# Patient Record
Sex: FEMALE | Race: WHITE | Employment: UNEMPLOYED | URBAN - METROPOLITAN AREA
[De-identification: names, ages, dates, MRNs, and addresses within clinical notes are randomized per-mention and may not be internally consistent; named-entity substitution may affect disease eponyms.]

---

## 2021-01-01 ENCOUNTER — HOSPITAL ENCOUNTER (EMERGENCY)
Age: 86
Discharge: HOME | End: 2021-12-04
Attending: EMERGENCY MEDICINE
Payer: MEDICARE

## 2021-01-01 ENCOUNTER — HOSPITAL ENCOUNTER (INPATIENT)
Age: 86
LOS: 8 days | Discharge: HOSPICE | DRG: 177 | End: 2021-12-16
Attending: EMERGENCY MEDICINE | Admitting: INTERNAL MEDICINE
Payer: MEDICARE

## 2021-01-01 ENCOUNTER — APPOINTMENT (OUTPATIENT)
Dept: GENERAL RADIOLOGY | Age: 86
DRG: 177 | End: 2021-01-01
Payer: MEDICARE

## 2021-01-01 ENCOUNTER — APPOINTMENT (OUTPATIENT)
Dept: GENERAL RADIOLOGY | Age: 86
End: 2021-01-01
Payer: MEDICARE

## 2021-01-01 ENCOUNTER — APPOINTMENT (OUTPATIENT)
Dept: CT IMAGING | Age: 86
End: 2021-01-01
Payer: MEDICARE

## 2021-01-01 ENCOUNTER — CARE COORDINATION (OUTPATIENT)
Dept: CARE COORDINATION | Age: 86
End: 2021-01-01

## 2021-01-01 VITALS
OXYGEN SATURATION: 97 % | RESPIRATION RATE: 16 BRPM | HEART RATE: 80 BPM | DIASTOLIC BLOOD PRESSURE: 69 MMHG | TEMPERATURE: 98.4 F | SYSTOLIC BLOOD PRESSURE: 122 MMHG

## 2021-01-01 VITALS
WEIGHT: 95.2 LBS | DIASTOLIC BLOOD PRESSURE: 75 MMHG | HEART RATE: 89 BPM | HEIGHT: 60 IN | TEMPERATURE: 93.8 F | RESPIRATION RATE: 12 BRPM | BODY MASS INDEX: 18.69 KG/M2 | OXYGEN SATURATION: 75 % | SYSTOLIC BLOOD PRESSURE: 132 MMHG

## 2021-01-01 DIAGNOSIS — U07.1 COVID-19: Primary | ICD-10-CM

## 2021-01-01 LAB
ACANTHOCYTES: ABNORMAL
ALBUMIN SERPL-MCNC: 3 G/DL (ref 3.5–5.2)
ALBUMIN SERPL-MCNC: 3 G/DL (ref 3.5–5.2)
ALBUMIN SERPL-MCNC: 3.1 G/DL (ref 3.5–5.2)
ALBUMIN SERPL-MCNC: 3.1 G/DL (ref 3.5–5.2)
ALBUMIN SERPL-MCNC: 3.2 G/DL (ref 3.5–5.2)
ALBUMIN SERPL-MCNC: 3.3 G/DL (ref 3.5–5.2)
ALBUMIN SERPL-MCNC: 3.5 G/DL (ref 3.5–5.2)
ALBUMIN SERPL-MCNC: 3.6 G/DL (ref 3.5–5.2)
ALP BLD-CCNC: 63 U/L (ref 35–104)
ALP BLD-CCNC: 69 U/L (ref 35–104)
ALP BLD-CCNC: 72 U/L (ref 35–104)
ALP BLD-CCNC: 74 U/L (ref 35–104)
ALP BLD-CCNC: 76 U/L (ref 35–104)
ALP BLD-CCNC: 78 U/L (ref 35–104)
ALP BLD-CCNC: 78 U/L (ref 35–104)
ALP BLD-CCNC: 81 U/L (ref 35–104)
ALT SERPL-CCNC: 10 U/L (ref 0–32)
ALT SERPL-CCNC: 10 U/L (ref 0–32)
ALT SERPL-CCNC: 11 U/L (ref 0–32)
ALT SERPL-CCNC: 12 U/L (ref 0–32)
ALT SERPL-CCNC: 15 U/L (ref 0–32)
ALT SERPL-CCNC: 19 U/L (ref 0–32)
ALT SERPL-CCNC: 21 U/L (ref 0–32)
ALT SERPL-CCNC: 9 U/L (ref 0–32)
ANION GAP SERPL CALCULATED.3IONS-SCNC: 12 MMOL/L (ref 7–16)
ANION GAP SERPL CALCULATED.3IONS-SCNC: 12 MMOL/L (ref 7–16)
ANION GAP SERPL CALCULATED.3IONS-SCNC: 13 MMOL/L (ref 7–16)
ANION GAP SERPL CALCULATED.3IONS-SCNC: 14 MMOL/L (ref 7–16)
ANION GAP SERPL CALCULATED.3IONS-SCNC: 14 MMOL/L (ref 7–16)
ANION GAP SERPL CALCULATED.3IONS-SCNC: 15 MMOL/L (ref 7–16)
ANION GAP SERPL CALCULATED.3IONS-SCNC: 17 MMOL/L (ref 7–16)
ANISOCYTOSIS: ABNORMAL
APTT: 25.6 SEC (ref 24.5–35.1)
AST SERPL-CCNC: 22 U/L (ref 0–31)
AST SERPL-CCNC: 24 U/L (ref 0–31)
AST SERPL-CCNC: 25 U/L (ref 0–31)
AST SERPL-CCNC: 26 U/L (ref 0–31)
AST SERPL-CCNC: 31 U/L (ref 0–31)
AST SERPL-CCNC: 32 U/L (ref 0–31)
AST SERPL-CCNC: 32 U/L (ref 0–31)
AST SERPL-CCNC: 36 U/L (ref 0–31)
ATYPICAL LYMPHOCYTE RELATIVE PERCENT: 2.6 % (ref 0–4)
BASOPHILS ABSOLUTE: 0 E9/L (ref 0–0.2)
BASOPHILS ABSOLUTE: 0.01 E9/L (ref 0–0.2)
BASOPHILS ABSOLUTE: 0.02 E9/L (ref 0–0.2)
BASOPHILS ABSOLUTE: 0.03 E9/L (ref 0–0.2)
BASOPHILS ABSOLUTE: 0.04 E9/L (ref 0–0.2)
BASOPHILS RELATIVE PERCENT: 0 % (ref 0–2)
BASOPHILS RELATIVE PERCENT: 0.1 % (ref 0–2)
BASOPHILS RELATIVE PERCENT: 0.2 % (ref 0–2)
BASOPHILS RELATIVE PERCENT: 0.3 % (ref 0–2)
BASOPHILS RELATIVE PERCENT: 0.5 % (ref 0–2)
BILIRUB SERPL-MCNC: 0.4 MG/DL (ref 0–1.2)
BILIRUB SERPL-MCNC: 0.4 MG/DL (ref 0–1.2)
BILIRUB SERPL-MCNC: 0.5 MG/DL (ref 0–1.2)
BILIRUB SERPL-MCNC: 0.5 MG/DL (ref 0–1.2)
BILIRUB SERPL-MCNC: 0.6 MG/DL (ref 0–1.2)
BILIRUB SERPL-MCNC: 0.6 MG/DL (ref 0–1.2)
BILIRUB SERPL-MCNC: 0.8 MG/DL (ref 0–1.2)
BILIRUB SERPL-MCNC: 0.8 MG/DL (ref 0–1.2)
BUN BLDV-MCNC: 25 MG/DL (ref 6–23)
BUN BLDV-MCNC: 29 MG/DL (ref 6–23)
BUN BLDV-MCNC: 31 MG/DL (ref 6–23)
BUN BLDV-MCNC: 43 MG/DL (ref 6–23)
BUN BLDV-MCNC: 45 MG/DL (ref 6–23)
BUN BLDV-MCNC: 56 MG/DL (ref 6–23)
BUN BLDV-MCNC: 84 MG/DL (ref 6–23)
BUN BLDV-MCNC: 92 MG/DL (ref 6–23)
BUN BLDV-MCNC: 94 MG/DL (ref 6–23)
BURR CELLS: ABNORMAL
BURR CELLS: ABNORMAL
C-REACTIVE PROTEIN: 0.9 MG/DL (ref 0–0.4)
C-REACTIVE PROTEIN: 1.6 MG/DL (ref 0–0.4)
C-REACTIVE PROTEIN: 11.7 MG/DL (ref 0–0.4)
C-REACTIVE PROTEIN: 18.5 MG/DL (ref 0–0.4)
C-REACTIVE PROTEIN: 6.6 MG/DL (ref 0–0.4)
C-REACTIVE PROTEIN: 7.7 MG/DL (ref 0–0.4)
C-REACTIVE PROTEIN: 7.8 MG/DL (ref 0–0.4)
CALCIUM SERPL-MCNC: 10.1 MG/DL (ref 8.6–10.2)
CALCIUM SERPL-MCNC: 10.1 MG/DL (ref 8.6–10.2)
CALCIUM SERPL-MCNC: 9.7 MG/DL (ref 8.6–10.2)
CALCIUM SERPL-MCNC: 9.8 MG/DL (ref 8.6–10.2)
CHLORIDE BLD-SCNC: 94 MMOL/L (ref 98–107)
CHLORIDE BLD-SCNC: 94 MMOL/L (ref 98–107)
CHLORIDE BLD-SCNC: 95 MMOL/L (ref 98–107)
CHLORIDE BLD-SCNC: 96 MMOL/L (ref 98–107)
CHLORIDE BLD-SCNC: 96 MMOL/L (ref 98–107)
CHLORIDE BLD-SCNC: 97 MMOL/L (ref 98–107)
CHLORIDE BLD-SCNC: 98 MMOL/L (ref 98–107)
CHLORIDE BLD-SCNC: 99 MMOL/L (ref 98–107)
CHLORIDE BLD-SCNC: 99 MMOL/L (ref 98–107)
CO2: 25 MMOL/L (ref 22–29)
CO2: 25 MMOL/L (ref 22–29)
CO2: 28 MMOL/L (ref 22–29)
CO2: 29 MMOL/L (ref 22–29)
CO2: 29 MMOL/L (ref 22–29)
CO2: 30 MMOL/L (ref 22–29)
CREAT SERPL-MCNC: 0.9 MG/DL (ref 0.5–1)
CREAT SERPL-MCNC: 1 MG/DL (ref 0.5–1)
CREAT SERPL-MCNC: 1.1 MG/DL (ref 0.5–1)
CREAT SERPL-MCNC: 1.1 MG/DL (ref 0.5–1)
CREAT SERPL-MCNC: 1.2 MG/DL (ref 0.5–1)
CREAT SERPL-MCNC: 1.3 MG/DL (ref 0.5–1)
D DIMER: 3206 NG/ML DDU
D DIMER: 4534 NG/ML DDU
D DIMER: 733 NG/ML DDU
D DIMER: 871 NG/ML DDU
D DIMER: 947 NG/ML DDU
D DIMER: 954 NG/ML DDU
D DIMER: 971 NG/ML DDU
EKG ATRIAL RATE: 125 BPM
EKG ATRIAL RATE: 77 BPM
EKG P AXIS: 61 DEGREES
EKG P AXIS: 72 DEGREES
EKG P-R INTERVAL: 120 MS
EKG P-R INTERVAL: 160 MS
EKG Q-T INTERVAL: 312 MS
EKG Q-T INTERVAL: 390 MS
EKG QRS DURATION: 60 MS
EKG QRS DURATION: 66 MS
EKG QTC CALCULATION (BAZETT): 441 MS
EKG QTC CALCULATION (BAZETT): 450 MS
EKG R AXIS: 70 DEGREES
EKG R AXIS: 76 DEGREES
EKG T AXIS: 69 DEGREES
EKG T AXIS: 72 DEGREES
EKG VENTRICULAR RATE: 125 BPM
EKG VENTRICULAR RATE: 77 BPM
EOSINOPHILS ABSOLUTE: 0 E9/L (ref 0.05–0.5)
EOSINOPHILS ABSOLUTE: 0.01 E9/L (ref 0.05–0.5)
EOSINOPHILS ABSOLUTE: 0.01 E9/L (ref 0.05–0.5)
EOSINOPHILS ABSOLUTE: 0.02 E9/L (ref 0.05–0.5)
EOSINOPHILS ABSOLUTE: 0.03 E9/L (ref 0.05–0.5)
EOSINOPHILS RELATIVE PERCENT: 0 % (ref 0–6)
EOSINOPHILS RELATIVE PERCENT: 0.1 % (ref 0–6)
EOSINOPHILS RELATIVE PERCENT: 0.1 % (ref 0–6)
EOSINOPHILS RELATIVE PERCENT: 0.3 % (ref 0–6)
EOSINOPHILS RELATIVE PERCENT: 0.3 % (ref 0–6)
FERRITIN: 1100 NG/ML
FIBRINOGEN: >700 MG/DL (ref 225–540)
GFR AFRICAN AMERICAN: 46
GFR AFRICAN AMERICAN: 51
GFR AFRICAN AMERICAN: 56
GFR AFRICAN AMERICAN: 56
GFR AFRICAN AMERICAN: >60
GFR NON-AFRICAN AMERICAN: 38 ML/MIN/1.73
GFR NON-AFRICAN AMERICAN: 42 ML/MIN/1.73
GFR NON-AFRICAN AMERICAN: 46 ML/MIN/1.73
GFR NON-AFRICAN AMERICAN: 46 ML/MIN/1.73
GFR NON-AFRICAN AMERICAN: 52 ML/MIN/1.73
GFR NON-AFRICAN AMERICAN: 58 ML/MIN/1.73
GLUCOSE BLD-MCNC: 102 MG/DL (ref 74–99)
GLUCOSE BLD-MCNC: 109 MG/DL (ref 74–99)
GLUCOSE BLD-MCNC: 119 MG/DL (ref 74–99)
GLUCOSE BLD-MCNC: 124 MG/DL (ref 74–99)
GLUCOSE BLD-MCNC: 141 MG/DL (ref 74–99)
GLUCOSE BLD-MCNC: 141 MG/DL (ref 74–99)
GLUCOSE BLD-MCNC: 174 MG/DL (ref 74–99)
GLUCOSE BLD-MCNC: 179 MG/DL (ref 74–99)
GLUCOSE BLD-MCNC: 188 MG/DL (ref 74–99)
HCT VFR BLD CALC: 40.4 % (ref 34–48)
HCT VFR BLD CALC: 41.2 % (ref 34–48)
HCT VFR BLD CALC: 42.5 % (ref 34–48)
HCT VFR BLD CALC: 42.9 % (ref 34–48)
HCT VFR BLD CALC: 43.3 % (ref 34–48)
HEMOGLOBIN: 13 G/DL (ref 11.5–15.5)
HEMOGLOBIN: 13.2 G/DL (ref 11.5–15.5)
HEMOGLOBIN: 13.3 G/DL (ref 11.5–15.5)
HEMOGLOBIN: 13.4 G/DL (ref 11.5–15.5)
HEMOGLOBIN: 13.8 G/DL (ref 11.5–15.5)
IMMATURE GRANULOCYTES #: 0.04 E9/L
IMMATURE GRANULOCYTES #: 0.05 E9/L
IMMATURE GRANULOCYTES #: 0.07 E9/L
IMMATURE GRANULOCYTES #: 0.12 E9/L
IMMATURE GRANULOCYTES %: 0.4 % (ref 0–5)
IMMATURE GRANULOCYTES %: 0.5 % (ref 0–5)
IMMATURE GRANULOCYTES %: 0.6 % (ref 0–5)
IMMATURE GRANULOCYTES %: 1 % (ref 0–5)
INR BLD: 1.1
LACTATE DEHYDROGENASE: 352 U/L (ref 135–214)
LACTIC ACID: 1.7 MMOL/L (ref 0.5–2.2)
LYMPHOCYTES ABSOLUTE: 0.28 E9/L (ref 1.5–4)
LYMPHOCYTES ABSOLUTE: 0.45 E9/L (ref 1.5–4)
LYMPHOCYTES ABSOLUTE: 0.66 E9/L (ref 1.5–4)
LYMPHOCYTES ABSOLUTE: 0.97 E9/L (ref 1.5–4)
LYMPHOCYTES ABSOLUTE: 1.43 E9/L (ref 1.5–4)
LYMPHOCYTES RELATIVE PERCENT: 12.1 % (ref 20–42)
LYMPHOCYTES RELATIVE PERCENT: 2.6 % (ref 20–42)
LYMPHOCYTES RELATIVE PERCENT: 5.6 % (ref 20–42)
LYMPHOCYTES RELATIVE PERCENT: 5.6 % (ref 20–42)
LYMPHOCYTES RELATIVE PERCENT: 8.2 % (ref 20–42)
MAGNESIUM: 1.8 MG/DL (ref 1.6–2.6)
MAGNESIUM: 2.1 MG/DL (ref 1.6–2.6)
MCH RBC QN AUTO: 27 PG (ref 26–35)
MCH RBC QN AUTO: 27.4 PG (ref 26–35)
MCH RBC QN AUTO: 27.5 PG (ref 26–35)
MCHC RBC AUTO-ENTMCNC: 31.1 % (ref 32–34.5)
MCHC RBC AUTO-ENTMCNC: 31.2 % (ref 32–34.5)
MCHC RBC AUTO-ENTMCNC: 31.9 % (ref 32–34.5)
MCHC RBC AUTO-ENTMCNC: 32.2 % (ref 32–34.5)
MCHC RBC AUTO-ENTMCNC: 32.3 % (ref 32–34.5)
MCV RBC AUTO: 85.1 FL (ref 80–99.9)
MCV RBC AUTO: 85.2 FL (ref 80–99.9)
MCV RBC AUTO: 85.9 FL (ref 80–99.9)
MCV RBC AUTO: 87.1 FL (ref 80–99.9)
MCV RBC AUTO: 87.7 FL (ref 80–99.9)
METAMYELOCYTES RELATIVE PERCENT: 0.9 % (ref 0–1)
MONOCYTES ABSOLUTE: 0.17 E9/L (ref 0.1–0.95)
MONOCYTES ABSOLUTE: 0.32 E9/L (ref 0.1–0.95)
MONOCYTES ABSOLUTE: 0.54 E9/L (ref 0.1–0.95)
MONOCYTES ABSOLUTE: 0.95 E9/L (ref 0.1–0.95)
MONOCYTES ABSOLUTE: 1.14 E9/L (ref 0.1–0.95)
MONOCYTES RELATIVE PERCENT: 2.6 % (ref 2–12)
MONOCYTES RELATIVE PERCENT: 2.7 % (ref 2–12)
MONOCYTES RELATIVE PERCENT: 6.8 % (ref 2–12)
MONOCYTES RELATIVE PERCENT: 8.1 % (ref 2–12)
MONOCYTES RELATIVE PERCENT: 9.7 % (ref 2–12)
MYELOCYTE PERCENT: 0.9 % (ref 0–0)
NEUTROPHILS ABSOLUTE: 10.8 E9/L (ref 1.8–7.3)
NEUTROPHILS ABSOLUTE: 5.15 E9/L (ref 1.8–7.3)
NEUTROPHILS ABSOLUTE: 6.88 E9/L (ref 1.8–7.3)
NEUTROPHILS ABSOLUTE: 9.12 E9/L (ref 1.8–7.3)
NEUTROPHILS ABSOLUTE: 9.68 E9/L (ref 1.8–7.3)
NEUTROPHILS RELATIVE PERCENT: 77.4 % (ref 43–80)
NEUTROPHILS RELATIVE PERCENT: 82.2 % (ref 43–80)
NEUTROPHILS RELATIVE PERCENT: 86.3 % (ref 43–80)
NEUTROPHILS RELATIVE PERCENT: 90.4 % (ref 43–80)
NEUTROPHILS RELATIVE PERCENT: 90.9 % (ref 43–80)
NUCLEATED RED BLOOD CELLS: 0 /100 WBC
OVALOCYTES: ABNORMAL
PDW BLD-RTO: 13.2 FL (ref 11.5–15)
PDW BLD-RTO: 13.3 FL (ref 11.5–15)
PDW BLD-RTO: 13.4 FL (ref 11.5–15)
PHOSPHORUS: 3.3 MG/DL (ref 2.5–4.5)
PLATELET # BLD: 228 E9/L (ref 130–450)
PLATELET # BLD: 234 E9/L (ref 130–450)
PLATELET # BLD: 236 E9/L (ref 130–450)
PLATELET # BLD: 302 E9/L (ref 130–450)
PLATELET # BLD: 303 E9/L (ref 130–450)
PMV BLD AUTO: 10.1 FL (ref 7–12)
PMV BLD AUTO: 10.6 FL (ref 7–12)
PMV BLD AUTO: 10.7 FL (ref 7–12)
PMV BLD AUTO: 11 FL (ref 7–12)
PMV BLD AUTO: 9.7 FL (ref 7–12)
POIKILOCYTES: ABNORMAL
POIKILOCYTES: ABNORMAL
POLYCHROMASIA: ABNORMAL
POTASSIUM REFLEX MAGNESIUM: 4 MMOL/L (ref 3.5–5)
POTASSIUM SERPL-SCNC: 3.4 MMOL/L (ref 3.5–5)
POTASSIUM SERPL-SCNC: 3.6 MMOL/L (ref 3.5–5)
POTASSIUM SERPL-SCNC: 3.8 MMOL/L (ref 3.5–5)
POTASSIUM SERPL-SCNC: 4.4 MMOL/L (ref 3.5–5)
POTASSIUM SERPL-SCNC: 4.5 MMOL/L (ref 3.5–5)
POTASSIUM SERPL-SCNC: 4.7 MMOL/L (ref 3.5–5)
PRO-BNP: 9137 PG/ML (ref 0–450)
PROCALCITONIN: 0.07 NG/ML (ref 0–0.08)
PROCALCITONIN: 0.14 NG/ML (ref 0–0.08)
PROTHROMBIN TIME: 12 SEC (ref 9.3–12.4)
RBC # BLD: 4.74 E12/L (ref 3.5–5.5)
RBC # BLD: 4.84 E12/L (ref 3.5–5.5)
RBC # BLD: 4.88 E12/L (ref 3.5–5.5)
RBC # BLD: 4.89 E12/L (ref 3.5–5.5)
RBC # BLD: 5.04 E12/L (ref 3.5–5.5)
SARS-COV-2, NAAT: DETECTED
SODIUM BLD-SCNC: 136 MMOL/L (ref 132–146)
SODIUM BLD-SCNC: 137 MMOL/L (ref 132–146)
SODIUM BLD-SCNC: 137 MMOL/L (ref 132–146)
SODIUM BLD-SCNC: 139 MMOL/L (ref 132–146)
SODIUM BLD-SCNC: 141 MMOL/L (ref 132–146)
SODIUM BLD-SCNC: 143 MMOL/L (ref 132–146)
TOTAL PROTEIN: 6.1 G/DL (ref 6.4–8.3)
TOTAL PROTEIN: 6.4 G/DL (ref 6.4–8.3)
TOTAL PROTEIN: 6.4 G/DL (ref 6.4–8.3)
TOTAL PROTEIN: 6.5 G/DL (ref 6.4–8.3)
TOTAL PROTEIN: 6.6 G/DL (ref 6.4–8.3)
TOTAL PROTEIN: 6.7 G/DL (ref 6.4–8.3)
TOTAL PROTEIN: 6.8 G/DL (ref 6.4–8.3)
TOTAL PROTEIN: 7 G/DL (ref 6.4–8.3)
TROPONIN, HIGH SENSITIVITY: 24 NG/L (ref 0–9)
TROPONIN, HIGH SENSITIVITY: 35 NG/L (ref 0–9)
TROPONIN, HIGH SENSITIVITY: 39 NG/L (ref 0–9)
TROPONIN, HIGH SENSITIVITY: 49 NG/L (ref 0–9)
TROPONIN, HIGH SENSITIVITY: 53 NG/L (ref 0–9)
TROPONIN, HIGH SENSITIVITY: 54 NG/L (ref 0–9)
WBC # BLD: 11.8 E9/L (ref 4.5–11.5)
WBC # BLD: 11.8 E9/L (ref 4.5–11.5)
WBC # BLD: 11.9 E9/L (ref 4.5–11.5)
WBC # BLD: 5.6 E9/L (ref 4.5–11.5)
WBC # BLD: 8 E9/L (ref 4.5–11.5)

## 2021-01-01 PROCEDURE — 99233 SBSQ HOSP IP/OBS HIGH 50: CPT | Performed by: INTERNAL MEDICINE

## 2021-01-01 PROCEDURE — 84100 ASSAY OF PHOSPHORUS: CPT

## 2021-01-01 PROCEDURE — 36415 COLL VENOUS BLD VENIPUNCTURE: CPT

## 2021-01-01 PROCEDURE — 6370000000 HC RX 637 (ALT 250 FOR IP): Performed by: PHYSICIAN ASSISTANT

## 2021-01-01 PROCEDURE — 6360000002 HC RX W HCPCS: Performed by: STUDENT IN AN ORGANIZED HEALTH CARE EDUCATION/TRAINING PROGRAM

## 2021-01-01 PROCEDURE — 6360000002 HC RX W HCPCS: Performed by: INTERNAL MEDICINE

## 2021-01-01 PROCEDURE — 6370000000 HC RX 637 (ALT 250 FOR IP): Performed by: EMERGENCY MEDICINE

## 2021-01-01 PROCEDURE — 2580000003 HC RX 258: Performed by: NURSE PRACTITIONER

## 2021-01-01 PROCEDURE — 99232 SBSQ HOSP IP/OBS MODERATE 35: CPT | Performed by: INTERNAL MEDICINE

## 2021-01-01 PROCEDURE — 85378 FIBRIN DEGRADE SEMIQUANT: CPT

## 2021-01-01 PROCEDURE — 83735 ASSAY OF MAGNESIUM: CPT

## 2021-01-01 PROCEDURE — 86140 C-REACTIVE PROTEIN: CPT

## 2021-01-01 PROCEDURE — 6370000000 HC RX 637 (ALT 250 FOR IP): Performed by: NURSE PRACTITIONER

## 2021-01-01 PROCEDURE — 82728 ASSAY OF FERRITIN: CPT

## 2021-01-01 PROCEDURE — 93010 ELECTROCARDIOGRAM REPORT: CPT | Performed by: INTERNAL MEDICINE

## 2021-01-01 PROCEDURE — 6360000004 HC RX CONTRAST MEDICATION: Performed by: RADIOLOGY

## 2021-01-01 PROCEDURE — 73030 X-RAY EXAM OF SHOULDER: CPT

## 2021-01-01 PROCEDURE — 97535 SELF CARE MNGMENT TRAINING: CPT

## 2021-01-01 PROCEDURE — 80053 COMPREHEN METABOLIC PANEL: CPT

## 2021-01-01 PROCEDURE — 99282 EMERGENCY DEPT VISIT SF MDM: CPT

## 2021-01-01 PROCEDURE — 2700000000 HC OXYGEN THERAPY PER DAY

## 2021-01-01 PROCEDURE — 83615 LACTATE (LD) (LDH) ENZYME: CPT

## 2021-01-01 PROCEDURE — 2060000000 HC ICU INTERMEDIATE R&B

## 2021-01-01 PROCEDURE — 85610 PROTHROMBIN TIME: CPT

## 2021-01-01 PROCEDURE — 85025 COMPLETE CBC W/AUTO DIFF WBC: CPT

## 2021-01-01 PROCEDURE — 84484 ASSAY OF TROPONIN QUANT: CPT

## 2021-01-01 PROCEDURE — 80048 BASIC METABOLIC PNL TOTAL CA: CPT

## 2021-01-01 PROCEDURE — 71275 CT ANGIOGRAPHY CHEST: CPT

## 2021-01-01 PROCEDURE — 93005 ELECTROCARDIOGRAM TRACING: CPT | Performed by: NURSE PRACTITIONER

## 2021-01-01 PROCEDURE — 6360000002 HC RX W HCPCS: Performed by: EMERGENCY MEDICINE

## 2021-01-01 PROCEDURE — 73060 X-RAY EXAM OF HUMERUS: CPT

## 2021-01-01 PROCEDURE — 85730 THROMBOPLASTIN TIME PARTIAL: CPT

## 2021-01-01 PROCEDURE — 99232 SBSQ HOSP IP/OBS MODERATE 35: CPT | Performed by: STUDENT IN AN ORGANIZED HEALTH CARE EDUCATION/TRAINING PROGRAM

## 2021-01-01 PROCEDURE — XW0DXM6 INTRODUCTION OF BARICITINIB INTO MOUTH AND PHARYNX, EXTERNAL APPROACH, NEW TECHNOLOGY GROUP 6: ICD-10-PCS | Performed by: FAMILY MEDICINE

## 2021-01-01 PROCEDURE — APPSS45 APP SPLIT SHARED TIME 31-45 MINUTES: Performed by: NURSE PRACTITIONER

## 2021-01-01 PROCEDURE — 83880 ASSAY OF NATRIURETIC PEPTIDE: CPT

## 2021-01-01 PROCEDURE — 83605 ASSAY OF LACTIC ACID: CPT

## 2021-01-01 PROCEDURE — 93005 ELECTROCARDIOGRAM TRACING: CPT | Performed by: PHYSICIAN ASSISTANT

## 2021-01-01 PROCEDURE — 99222 1ST HOSP IP/OBS MODERATE 55: CPT | Performed by: STUDENT IN AN ORGANIZED HEALTH CARE EDUCATION/TRAINING PROGRAM

## 2021-01-01 PROCEDURE — 97530 THERAPEUTIC ACTIVITIES: CPT

## 2021-01-01 PROCEDURE — 99284 EMERGENCY DEPT VISIT MOD MDM: CPT

## 2021-01-01 PROCEDURE — 71046 X-RAY EXAM CHEST 2 VIEWS: CPT

## 2021-01-01 PROCEDURE — 97161 PT EVAL LOW COMPLEX 20 MIN: CPT

## 2021-01-01 PROCEDURE — 71045 X-RAY EXAM CHEST 1 VIEW: CPT

## 2021-01-01 PROCEDURE — 84145 PROCALCITONIN (PCT): CPT

## 2021-01-01 PROCEDURE — 72170 X-RAY EXAM OF PELVIS: CPT

## 2021-01-01 PROCEDURE — 73600 X-RAY EXAM OF ANKLE: CPT

## 2021-01-01 PROCEDURE — 85384 FIBRINOGEN ACTIVITY: CPT

## 2021-01-01 PROCEDURE — 87635 SARS-COV-2 COVID-19 AMP PRB: CPT

## 2021-01-01 PROCEDURE — 6360000002 HC RX W HCPCS: Performed by: NURSE PRACTITIONER

## 2021-01-01 PROCEDURE — 97165 OT EVAL LOW COMPLEX 30 MIN: CPT

## 2021-01-01 RX ORDER — DEXAMETHASONE 4 MG/1
6 TABLET ORAL DAILY
Status: DISCONTINUED | OUTPATIENT
Start: 2021-01-01 | End: 2021-01-01

## 2021-01-01 RX ORDER — GLYCOPYRROLATE 0.2 MG/ML
0.4 INJECTION INTRAMUSCULAR; INTRAVENOUS EVERY 6 HOURS PRN
Status: DISCONTINUED | OUTPATIENT
Start: 2021-01-01 | End: 2021-01-01 | Stop reason: HOSPADM

## 2021-01-01 RX ORDER — DEXAMETHASONE SODIUM PHOSPHATE 10 MG/ML
10 INJECTION, SOLUTION INTRAMUSCULAR; INTRAVENOUS EVERY 12 HOURS
Status: DISCONTINUED | OUTPATIENT
Start: 2021-01-01 | End: 2021-01-01

## 2021-01-01 RX ORDER — ATORVASTATIN CALCIUM 40 MG/1
20 TABLET, FILM COATED ORAL DAILY
Status: DISCONTINUED | OUTPATIENT
Start: 2021-01-01 | End: 2021-01-01

## 2021-01-01 RX ORDER — ONDANSETRON 2 MG/ML
4 INJECTION INTRAMUSCULAR; INTRAVENOUS EVERY 6 HOURS PRN
Status: DISCONTINUED | OUTPATIENT
Start: 2021-01-01 | End: 2021-01-01 | Stop reason: HOSPADM

## 2021-01-01 RX ORDER — METOPROLOL SUCCINATE 25 MG/1
50 TABLET, EXTENDED RELEASE ORAL DAILY
Status: DISCONTINUED | OUTPATIENT
Start: 2021-01-01 | End: 2021-01-01 | Stop reason: HOSPADM

## 2021-01-01 RX ORDER — ONDANSETRON 4 MG/1
4 TABLET, ORALLY DISINTEGRATING ORAL 3 TIMES DAILY PRN
Qty: 21 TABLET | Refills: 0 | Status: SHIPPED | OUTPATIENT
Start: 2021-01-01

## 2021-01-01 RX ORDER — ALBUTEROL SULFATE 90 UG/1
2 AEROSOL, METERED RESPIRATORY (INHALATION) 4 TIMES DAILY
Status: DISCONTINUED | OUTPATIENT
Start: 2021-01-01 | End: 2021-01-01

## 2021-01-01 RX ORDER — BROMPHENIRAMINE MALEATE, PSEUDOEPHEDRINE HYDROCHLORIDE, AND DEXTROMETHORPHAN HYDROBROMIDE 2; 30; 10 MG/5ML; MG/5ML; MG/5ML
5 SYRUP ORAL 4 TIMES DAILY PRN
Qty: 240 ML | Refills: 0 | Status: SHIPPED | OUTPATIENT
Start: 2021-01-01 | End: 2022-01-03

## 2021-01-01 RX ORDER — BROMPHENIRAMINE MALEATE, PSEUDOEPHEDRINE HYDROCHLORIDE, AND DEXTROMETHORPHAN HYDROBROMIDE 2; 30; 10 MG/5ML; MG/5ML; MG/5ML
5 SYRUP ORAL ONCE
Status: COMPLETED | OUTPATIENT
Start: 2021-01-01 | End: 2021-01-01

## 2021-01-01 RX ORDER — VITAMIN B COMPLEX
2000 TABLET ORAL DAILY
Status: DISCONTINUED | OUTPATIENT
Start: 2021-01-01 | End: 2021-01-01

## 2021-01-01 RX ORDER — DEXAMETHASONE SODIUM PHOSPHATE 10 MG/ML
10 INJECTION INTRAMUSCULAR; INTRAVENOUS ONCE
Status: COMPLETED | OUTPATIENT
Start: 2021-01-01 | End: 2021-01-01

## 2021-01-01 RX ORDER — SODIUM CHLORIDE 9 MG/ML
25 INJECTION, SOLUTION INTRAVENOUS PRN
Status: DISCONTINUED | OUTPATIENT
Start: 2021-01-01 | End: 2021-01-01 | Stop reason: HOSPADM

## 2021-01-01 RX ORDER — GUAIFENESIN/DEXTROMETHORPHAN 100-10MG/5
5 SYRUP ORAL EVERY 4 HOURS PRN
Status: DISCONTINUED | OUTPATIENT
Start: 2021-01-01 | End: 2021-01-01 | Stop reason: HOSPADM

## 2021-01-01 RX ORDER — ONDANSETRON 4 MG/1
4 TABLET, ORALLY DISINTEGRATING ORAL EVERY 8 HOURS PRN
Status: DISCONTINUED | OUTPATIENT
Start: 2021-01-01 | End: 2021-01-01 | Stop reason: HOSPADM

## 2021-01-01 RX ORDER — ASCORBIC ACID 500 MG
500 TABLET ORAL 3 TIMES DAILY
Status: DISCONTINUED | OUTPATIENT
Start: 2021-01-01 | End: 2021-01-01

## 2021-01-01 RX ORDER — SIMVASTATIN 40 MG
40 TABLET ORAL DAILY
COMMUNITY

## 2021-01-01 RX ORDER — ZINC SULFATE 50(220)MG
50 CAPSULE ORAL DAILY
Status: DISCONTINUED | OUTPATIENT
Start: 2021-01-01 | End: 2021-01-01

## 2021-01-01 RX ORDER — ACETAMINOPHEN 650 MG/1
650 SUPPOSITORY RECTAL EVERY 6 HOURS PRN
Status: DISCONTINUED | OUTPATIENT
Start: 2021-01-01 | End: 2021-01-01 | Stop reason: HOSPADM

## 2021-01-01 RX ORDER — MORPHINE SULFATE 2 MG/ML
2 INJECTION, SOLUTION INTRAMUSCULAR; INTRAVENOUS
Status: DISCONTINUED | OUTPATIENT
Start: 2021-01-01 | End: 2021-01-01 | Stop reason: HOSPADM

## 2021-01-01 RX ORDER — LORAZEPAM 2 MG/ML
0.5 INJECTION INTRAMUSCULAR
Status: DISCONTINUED | OUTPATIENT
Start: 2021-01-01 | End: 2021-01-01 | Stop reason: HOSPADM

## 2021-01-01 RX ORDER — SODIUM CHLORIDE 0.9 % (FLUSH) 0.9 %
5-40 SYRINGE (ML) INJECTION EVERY 12 HOURS SCHEDULED
Status: DISCONTINUED | OUTPATIENT
Start: 2021-01-01 | End: 2021-01-01

## 2021-01-01 RX ORDER — SODIUM CHLORIDE 0.9 % (FLUSH) 0.9 %
5-40 SYRINGE (ML) INJECTION PRN
Status: DISCONTINUED | OUTPATIENT
Start: 2021-01-01 | End: 2021-01-01 | Stop reason: HOSPADM

## 2021-01-01 RX ORDER — ACETAMINOPHEN 325 MG/1
650 TABLET ORAL EVERY 6 HOURS PRN
Status: DISCONTINUED | OUTPATIENT
Start: 2021-01-01 | End: 2021-01-01 | Stop reason: HOSPADM

## 2021-01-01 RX ORDER — POLYETHYLENE GLYCOL 3350 17 G/17G
17 POWDER, FOR SOLUTION ORAL DAILY PRN
Status: DISCONTINUED | OUTPATIENT
Start: 2021-01-01 | End: 2021-01-01 | Stop reason: HOSPADM

## 2021-01-01 RX ORDER — METOPROLOL SUCCINATE 50 MG/1
50 TABLET, EXTENDED RELEASE ORAL DAILY
COMMUNITY

## 2021-01-01 RX ADMIN — Medication 10 ML: at 10:15

## 2021-01-01 RX ADMIN — ALBUTEROL SULFATE 2 PUFF: 90 AEROSOL, METERED RESPIRATORY (INHALATION) at 20:53

## 2021-01-01 RX ADMIN — OXYCODONE HYDROCHLORIDE AND ACETAMINOPHEN 500 MG: 500 TABLET ORAL at 18:44

## 2021-01-01 RX ADMIN — METOPROLOL SUCCINATE 50 MG: 50 TABLET, EXTENDED RELEASE ORAL at 08:24

## 2021-01-01 RX ADMIN — DEXAMETHASONE SODIUM PHOSPHATE 10 MG: 10 INJECTION INTRAMUSCULAR; INTRAVENOUS at 20:18

## 2021-01-01 RX ADMIN — BARICITINIB 2 MG: 2 TABLET, FILM COATED ORAL at 10:04

## 2021-01-01 RX ADMIN — DEXAMETHASONE SODIUM PHOSPHATE 10 MG: 10 INJECTION INTRAMUSCULAR; INTRAVENOUS at 10:17

## 2021-01-01 RX ADMIN — ALBUTEROL SULFATE 2 PUFF: 90 AEROSOL, METERED RESPIRATORY (INHALATION) at 10:44

## 2021-01-01 RX ADMIN — ALBUTEROL SULFATE 2 PUFF: 90 AEROSOL, METERED RESPIRATORY (INHALATION) at 20:18

## 2021-01-01 RX ADMIN — METOPROLOL SUCCINATE 50 MG: 50 TABLET, EXTENDED RELEASE ORAL at 17:29

## 2021-01-01 RX ADMIN — LORAZEPAM 0.5 MG: 2 INJECTION INTRAMUSCULAR; INTRAVENOUS at 15:53

## 2021-01-01 RX ADMIN — ALBUTEROL SULFATE 2 PUFF: 90 AEROSOL, METERED RESPIRATORY (INHALATION) at 20:03

## 2021-01-01 RX ADMIN — ENOXAPARIN SODIUM 30 MG: 100 INJECTION SUBCUTANEOUS at 10:53

## 2021-01-01 RX ADMIN — Medication 2000 UNITS: at 09:42

## 2021-01-01 RX ADMIN — DEXAMETHASONE SODIUM PHOSPHATE 10 MG: 10 INJECTION INTRAMUSCULAR; INTRAVENOUS at 21:01

## 2021-01-01 RX ADMIN — OXYCODONE HYDROCHLORIDE AND ACETAMINOPHEN 500 MG: 500 TABLET ORAL at 10:41

## 2021-01-01 RX ADMIN — ENOXAPARIN SODIUM 30 MG: 100 INJECTION SUBCUTANEOUS at 14:33

## 2021-01-01 RX ADMIN — BROMPHENIRAMINE MALEATE, PSEUDOEPHEDRINE HYDROCHLORIDE AND DEXTROMETHORPHAN HYDROBROMIDE 5 ML: 2; 30; 10 SYRUP ORAL at 23:48

## 2021-01-01 RX ADMIN — ENOXAPARIN SODIUM 30 MG: 100 INJECTION SUBCUTANEOUS at 09:42

## 2021-01-01 RX ADMIN — DEXAMETHASONE SODIUM PHOSPHATE 10 MG: 10 INJECTION INTRAMUSCULAR; INTRAVENOUS at 01:38

## 2021-01-01 RX ADMIN — OXYCODONE HYDROCHLORIDE AND ACETAMINOPHEN 500 MG: 500 TABLET ORAL at 10:16

## 2021-01-01 RX ADMIN — MORPHINE SULFATE 2 MG: 2 INJECTION, SOLUTION INTRAMUSCULAR; INTRAVENOUS at 17:43

## 2021-01-01 RX ADMIN — ENOXAPARIN SODIUM 30 MG: 100 INJECTION SUBCUTANEOUS at 10:41

## 2021-01-01 RX ADMIN — METOPROLOL SUCCINATE 50 MG: 50 TABLET, EXTENDED RELEASE ORAL at 10:41

## 2021-01-01 RX ADMIN — Medication 10 ML: at 20:03

## 2021-01-01 RX ADMIN — BARICITINIB 2 MG: 2 TABLET, FILM COATED ORAL at 17:32

## 2021-01-01 RX ADMIN — ALBUTEROL SULFATE 2 PUFF: 90 AEROSOL, METERED RESPIRATORY (INHALATION) at 20:11

## 2021-01-01 RX ADMIN — DESMOPRESSIN ACETATE 20 MG: 0.2 TABLET ORAL at 10:47

## 2021-01-01 RX ADMIN — OXYCODONE HYDROCHLORIDE AND ACETAMINOPHEN 500 MG: 500 TABLET ORAL at 20:50

## 2021-01-01 RX ADMIN — BARICITINIB 2 MG: 2 TABLET, FILM COATED ORAL at 08:23

## 2021-01-01 RX ADMIN — OXYCODONE HYDROCHLORIDE AND ACETAMINOPHEN 500 MG: 500 TABLET ORAL at 20:03

## 2021-01-01 RX ADMIN — ALBUTEROL SULFATE 2 PUFF: 90 AEROSOL, METERED RESPIRATORY (INHALATION) at 12:30

## 2021-01-01 RX ADMIN — ALBUTEROL SULFATE 2 PUFF: 90 AEROSOL, METERED RESPIRATORY (INHALATION) at 10:47

## 2021-01-01 RX ADMIN — Medication 10 ML: at 09:43

## 2021-01-01 RX ADMIN — ALBUTEROL SULFATE 2 PUFF: 90 AEROSOL, METERED RESPIRATORY (INHALATION) at 16:45

## 2021-01-01 RX ADMIN — Medication 10 ML: at 22:36

## 2021-01-01 RX ADMIN — DEXAMETHASONE SODIUM PHOSPHATE 10 MG: 10 INJECTION INTRAMUSCULAR; INTRAVENOUS at 19:48

## 2021-01-01 RX ADMIN — OXYCODONE HYDROCHLORIDE AND ACETAMINOPHEN 500 MG: 500 TABLET ORAL at 14:29

## 2021-01-01 RX ADMIN — GUAIFENESIN SYRUP AND DEXTROMETHORPHAN 5 ML: 100; 10 SYRUP ORAL at 14:28

## 2021-01-01 RX ADMIN — Medication 2000 UNITS: at 08:23

## 2021-01-01 RX ADMIN — Medication 10 ML: at 20:50

## 2021-01-01 RX ADMIN — DEXAMETHASONE SODIUM PHOSPHATE 10 MG: 10 INJECTION INTRAMUSCULAR; INTRAVENOUS at 20:50

## 2021-01-01 RX ADMIN — ALBUTEROL SULFATE 2 PUFF: 90 AEROSOL, METERED RESPIRATORY (INHALATION) at 18:45

## 2021-01-01 RX ADMIN — IOPAMIDOL 75 ML: 755 INJECTION, SOLUTION INTRAVENOUS at 22:16

## 2021-01-01 RX ADMIN — ALBUTEROL SULFATE 2 PUFF: 90 AEROSOL, METERED RESPIRATORY (INHALATION) at 16:30

## 2021-01-01 RX ADMIN — DEXAMETHASONE SODIUM PHOSPHATE 10 MG: 10 INJECTION INTRAMUSCULAR; INTRAVENOUS at 20:11

## 2021-01-01 RX ADMIN — Medication 10 ML: at 22:03

## 2021-01-01 RX ADMIN — DESMOPRESSIN ACETATE 20 MG: 0.2 TABLET ORAL at 10:03

## 2021-01-01 RX ADMIN — Medication 10 ML: at 19:48

## 2021-01-01 RX ADMIN — ENOXAPARIN SODIUM 30 MG: 100 INJECTION SUBCUTANEOUS at 08:23

## 2021-01-01 RX ADMIN — GUAIFENESIN SYRUP AND DEXTROMETHORPHAN 5 ML: 100; 10 SYRUP ORAL at 20:18

## 2021-01-01 RX ADMIN — DEXAMETHASONE SODIUM PHOSPHATE 10 MG: 10 INJECTION INTRAMUSCULAR; INTRAVENOUS at 09:00

## 2021-01-01 RX ADMIN — Medication 10 ML: at 20:18

## 2021-01-01 RX ADMIN — DESMOPRESSIN ACETATE 20 MG: 0.2 TABLET ORAL at 08:23

## 2021-01-01 RX ADMIN — LORAZEPAM 0.5 MG: 2 INJECTION INTRAMUSCULAR; INTRAVENOUS at 16:23

## 2021-01-01 RX ADMIN — Medication 10 ML: at 10:45

## 2021-01-01 RX ADMIN — DESMOPRESSIN ACETATE 20 MG: 0.2 TABLET ORAL at 11:08

## 2021-01-01 RX ADMIN — Medication 10 ML: at 10:54

## 2021-01-01 RX ADMIN — MORPHINE SULFATE 2 MG: 2 INJECTION, SOLUTION INTRAMUSCULAR; INTRAVENOUS at 18:57

## 2021-01-01 RX ADMIN — MORPHINE SULFATE 2 MG: 2 INJECTION, SOLUTION INTRAMUSCULAR; INTRAVENOUS at 22:36

## 2021-01-01 RX ADMIN — DEXAMETHASONE SODIUM PHOSPHATE 10 MG: 10 INJECTION INTRAMUSCULAR; INTRAVENOUS at 14:31

## 2021-01-01 RX ADMIN — OXYCODONE HYDROCHLORIDE AND ACETAMINOPHEN 500 MG: 500 TABLET ORAL at 14:51

## 2021-01-01 RX ADMIN — ALBUTEROL SULFATE 2 PUFF: 90 AEROSOL, METERED RESPIRATORY (INHALATION) at 21:01

## 2021-01-01 RX ADMIN — OXYCODONE HYDROCHLORIDE AND ACETAMINOPHEN 500 MG: 500 TABLET ORAL at 10:04

## 2021-01-01 RX ADMIN — OXYCODONE HYDROCHLORIDE AND ACETAMINOPHEN 500 MG: 500 TABLET ORAL at 20:18

## 2021-01-01 RX ADMIN — DEXAMETHASONE SODIUM PHOSPHATE 10 MG: 10 INJECTION INTRAMUSCULAR; INTRAVENOUS at 10:48

## 2021-01-01 RX ADMIN — ZINC SULFATE 220 MG (50 MG) CAPSULE 50 MG: CAPSULE at 10:20

## 2021-01-01 RX ADMIN — BARICITINIB 2 MG: 2 TABLET, FILM COATED ORAL at 10:52

## 2021-01-01 RX ADMIN — ALBUTEROL SULFATE 2 PUFF: 90 AEROSOL, METERED RESPIRATORY (INHALATION) at 10:05

## 2021-01-01 RX ADMIN — ALBUTEROL SULFATE 2 PUFF: 90 AEROSOL, METERED RESPIRATORY (INHALATION) at 12:00

## 2021-01-01 RX ADMIN — Medication 10 ML: at 10:49

## 2021-01-01 RX ADMIN — Medication 10 ML: at 10:20

## 2021-01-01 RX ADMIN — ENOXAPARIN SODIUM 30 MG: 100 INJECTION SUBCUTANEOUS at 10:48

## 2021-01-01 RX ADMIN — Medication 10 ML: at 20:53

## 2021-01-01 RX ADMIN — Medication 2000 UNITS: at 10:03

## 2021-01-01 RX ADMIN — METOPROLOL SUCCINATE 50 MG: 50 TABLET, EXTENDED RELEASE ORAL at 10:53

## 2021-01-01 RX ADMIN — ENOXAPARIN SODIUM 30 MG: 100 INJECTION SUBCUTANEOUS at 10:17

## 2021-01-01 RX ADMIN — METOPROLOL SUCCINATE 50 MG: 50 TABLET, EXTENDED RELEASE ORAL at 10:16

## 2021-01-01 RX ADMIN — LORAZEPAM 0.5 MG: 2 INJECTION INTRAMUSCULAR; INTRAVENOUS at 15:37

## 2021-01-01 RX ADMIN — Medication 2000 UNITS: at 18:43

## 2021-01-01 RX ADMIN — METOPROLOL SUCCINATE 50 MG: 50 TABLET, EXTENDED RELEASE ORAL at 10:47

## 2021-01-01 RX ADMIN — BARICITINIB 2 MG: 2 TABLET, FILM COATED ORAL at 10:42

## 2021-01-01 RX ADMIN — MORPHINE SULFATE 2 MG: 2 INJECTION, SOLUTION INTRAMUSCULAR; INTRAVENOUS at 16:08

## 2021-01-01 RX ADMIN — ALBUTEROL SULFATE 2 PUFF: 90 AEROSOL, METERED RESPIRATORY (INHALATION) at 08:30

## 2021-01-01 RX ADMIN — ALBUTEROL SULFATE 2 PUFF: 90 AEROSOL, METERED RESPIRATORY (INHALATION) at 17:09

## 2021-01-01 RX ADMIN — ENOXAPARIN SODIUM 30 MG: 100 INJECTION SUBCUTANEOUS at 10:09

## 2021-01-01 RX ADMIN — ZINC SULFATE 220 MG (50 MG) CAPSULE 50 MG: CAPSULE at 09:42

## 2021-01-01 RX ADMIN — BARICITINIB 2 MG: 2 TABLET, FILM COATED ORAL at 10:47

## 2021-01-01 RX ADMIN — DESMOPRESSIN ACETATE 20 MG: 0.2 TABLET ORAL at 10:53

## 2021-01-01 RX ADMIN — DESMOPRESSIN ACETATE 20 MG: 0.2 TABLET ORAL at 17:29

## 2021-01-01 RX ADMIN — MORPHINE SULFATE 2 MG: 2 INJECTION, SOLUTION INTRAMUSCULAR; INTRAVENOUS at 14:02

## 2021-01-01 RX ADMIN — OXYCODONE HYDROCHLORIDE AND ACETAMINOPHEN 500 MG: 500 TABLET ORAL at 15:00

## 2021-01-01 RX ADMIN — ZINC SULFATE 220 MG (50 MG) CAPSULE 50 MG: CAPSULE at 10:50

## 2021-01-01 RX ADMIN — METOPROLOL SUCCINATE 50 MG: 50 TABLET, EXTENDED RELEASE ORAL at 10:04

## 2021-01-01 RX ADMIN — DESMOPRESSIN ACETATE 20 MG: 0.2 TABLET ORAL at 10:41

## 2021-01-01 RX ADMIN — ALBUTEROL SULFATE 2 PUFF: 90 AEROSOL, METERED RESPIRATORY (INHALATION) at 20:50

## 2021-01-01 RX ADMIN — Medication 2000 UNITS: at 10:41

## 2021-01-01 RX ADMIN — LORAZEPAM 0.5 MG: 2 INJECTION INTRAMUSCULAR; INTRAVENOUS at 17:43

## 2021-01-01 RX ADMIN — BARICITINIB 2 MG: 2 TABLET, FILM COATED ORAL at 10:17

## 2021-01-01 RX ADMIN — OXYCODONE HYDROCHLORIDE AND ACETAMINOPHEN 500 MG: 500 TABLET ORAL at 09:42

## 2021-01-01 RX ADMIN — LORAZEPAM 0.5 MG: 2 INJECTION INTRAMUSCULAR; INTRAVENOUS at 18:57

## 2021-01-01 RX ADMIN — ZINC SULFATE 220 MG (50 MG) CAPSULE 50 MG: CAPSULE at 10:41

## 2021-01-01 RX ADMIN — LORAZEPAM 0.5 MG: 2 INJECTION INTRAMUSCULAR; INTRAVENOUS at 18:22

## 2021-01-01 RX ADMIN — ZINC SULFATE 220 MG (50 MG) CAPSULE 50 MG: CAPSULE at 10:04

## 2021-01-01 RX ADMIN — OXYCODONE HYDROCHLORIDE AND ACETAMINOPHEN 500 MG: 500 TABLET ORAL at 20:11

## 2021-01-01 RX ADMIN — DEXAMETHASONE 6 MG: 4 TABLET ORAL at 05:58

## 2021-01-01 RX ADMIN — MORPHINE SULFATE 2 MG: 2 INJECTION, SOLUTION INTRAMUSCULAR; INTRAVENOUS at 16:37

## 2021-01-01 RX ADMIN — ALBUTEROL SULFATE 2 PUFF: 90 AEROSOL, METERED RESPIRATORY (INHALATION) at 16:33

## 2021-01-01 RX ADMIN — OXYCODONE HYDROCHLORIDE AND ACETAMINOPHEN 500 MG: 500 TABLET ORAL at 08:23

## 2021-01-01 RX ADMIN — MORPHINE SULFATE 2 MG: 2 INJECTION, SOLUTION INTRAMUSCULAR; INTRAVENOUS at 18:22

## 2021-01-01 RX ADMIN — DEXAMETHASONE SODIUM PHOSPHATE 10 MG: 10 INJECTION INTRAMUSCULAR; INTRAVENOUS at 10:53

## 2021-01-01 RX ADMIN — MORPHINE SULFATE 2 MG: 2 INJECTION, SOLUTION INTRAMUSCULAR; INTRAVENOUS at 16:23

## 2021-01-01 RX ADMIN — Medication 10 ML: at 16:16

## 2021-01-01 RX ADMIN — OXYCODONE HYDROCHLORIDE AND ACETAMINOPHEN 500 MG: 500 TABLET ORAL at 10:47

## 2021-01-01 RX ADMIN — MORPHINE SULFATE 2 MG: 2 INJECTION, SOLUTION INTRAMUSCULAR; INTRAVENOUS at 15:53

## 2021-01-01 RX ADMIN — Medication 10 ML: at 22:37

## 2021-01-01 RX ADMIN — LORAZEPAM 0.5 MG: 2 INJECTION INTRAMUSCULAR; INTRAVENOUS at 16:08

## 2021-01-01 RX ADMIN — DEXAMETHASONE SODIUM PHOSPHATE 10 MG: 10 INJECTION INTRAMUSCULAR; INTRAVENOUS at 10:09

## 2021-01-01 RX ADMIN — Medication 10 ML: at 20:11

## 2021-01-01 RX ADMIN — OXYCODONE HYDROCHLORIDE AND ACETAMINOPHEN 500 MG: 500 TABLET ORAL at 20:53

## 2021-01-01 RX ADMIN — Medication 2000 UNITS: at 10:47

## 2021-01-01 RX ADMIN — GUAIFENESIN SYRUP AND DEXTROMETHORPHAN 5 ML: 100; 10 SYRUP ORAL at 19:04

## 2021-01-01 RX ADMIN — LORAZEPAM 0.5 MG: 2 INJECTION INTRAMUSCULAR; INTRAVENOUS at 22:36

## 2021-01-01 RX ADMIN — OXYCODONE HYDROCHLORIDE AND ACETAMINOPHEN 500 MG: 500 TABLET ORAL at 13:42

## 2021-01-01 RX ADMIN — MORPHINE SULFATE 2 MG: 2 INJECTION, SOLUTION INTRAMUSCULAR; INTRAVENOUS at 15:37

## 2021-01-01 RX ADMIN — ZINC SULFATE 220 MG (50 MG) CAPSULE 50 MG: CAPSULE at 10:54

## 2021-01-01 RX ADMIN — ZINC SULFATE 220 MG (50 MG) CAPSULE 50 MG: CAPSULE at 08:23

## 2021-01-01 ASSESSMENT — ENCOUNTER SYMPTOMS
ABDOMINAL PAIN: 0
NAUSEA: 0
VOICE CHANGE: 0
BLOOD IN STOOL: 0
VOMITING: 0
SORE THROAT: 1
EYE REDNESS: 0
TROUBLE SWALLOWING: 0
COUGH: 1
BACK PAIN: 0
BACK PAIN: 0
EYE DISCHARGE: 0
DIARRHEA: 1
COUGH: 1
SHORTNESS OF BREATH: 1

## 2021-01-01 ASSESSMENT — PAIN SCALES - GENERAL
PAINLEVEL_OUTOF10: 0

## 2021-12-03 NOTE — ED NOTES
FIRST PROVIDER CONTACT ASSESSMENT NOTE           Department of Emergency Medicine                 First Provider Note            12/3/21  4:58 PM EST    Date of Encounter: No admission date for patient encounter. Patient Name: Maria Del Carmen Phoenix  : 1928  MRN: [id number]    Chief Complaint: Shortness of Breath (94% RA, noticed by son today) and Pharyngitis      History of Present Illness:   Maria Del Carmen Phoenix is a 80 y.o. female who presents to the ED for shortness of breath. Patient is 94% on room air. Patient noticed today that she is having shortness of breath and sore throat. Patient was not vaccinated    Focused Physical Exam:  VS:    ED Triage Vitals   BP Temp Temp src Pulse Resp SpO2 Height Weight   -- -- -- -- -- -- -- --        Physical Ex: Constitutional: Alert and non-toxic. Medical History:  has no past medical history on file. Surgical History:  has no past surgical history on file. Social History:    Family History: family history is not on file. Allergies: Patient has no known allergies.      Initial Plan of Care: Initiate Treatment-Testing, Proceed toTreatment Area When Bed Available for ED Attending/MLP to Continue Care      ---END OF FIRST PROVIDER CONTACT ASSESSMENT NOTE---  Electronically signed by Renard Muniz PA-C   DD: 12/3/21       Renard Muniz PA-C  [phone]

## 2021-12-04 NOTE — ED NOTES
Patient ambulated on Room Air  Start: 95% HR: 84  End: 92% HR: 86  No signs of distress noted. Patient had no complaints. Attending notified.        Hellen Pierce RN  12/04/21 [id number]

## 2021-12-06 NOTE — CARE COORDINATION
Patient contacted regarding COVID-19 diagnosis. Discussed COVID-19 related testing which was available at this time. Test results were positive. Patient informed of results, if available? Yes. ACM spoke with patient's son and Tyler Judge. Ambulatory Care Manager contacted the family by telephone to perform post discharge assessment. Call within 2 business days of discharge: Yes. Verified name and  with family as identifiers. Provided introduction to self, and explanation of the CTN/ACM role, and reason for call due to risk factors for infection and/or exposure to COVID-19. Symptoms reviewed with family who verbalized the following symptoms: fatigue, cough, no new symptoms and no worsening symptoms. Due to no new or worsening symptoms encounter was not routed to provider for escalation. Discussed follow-up appointments. If no appointment was previously scheduled, appointment scheduling offered: Yes. [facility] follow up appointment(s): No future appointments. Non-[facility] follow up appointment(s):  Patient's son, Rosibel Chairez states that he will call the office of Dr. Klever Fleming to schedule a follow up call. Non-face-to-face services provided:  Obtained and reviewed discharge summary and/or continuity of care documents     Advance Care Planning:   Does patient have an Advance Directive:  reviewed and current. Educated patient about risk for severe COVID-19 due to risk factors according to CDC guidelines. ACM reviewed discharge instructions, medical action plan and red flag symptoms with the family who verbalized understanding. Discussed COVID vaccination status: Yes. Education provided on COVID-19 vaccination as appropriate. Discussed exposure protocols and quarantine with CDC Guidelines. Family was given an opportunity to verbalize any questions and concerns and agrees to contact ACM or health care provider for questions related to their healthcare.     Reviewed and educated family on any new and changed medications related to discharge diagnosis     Was patient discharged with a pulse oximeter? No     -Patient's sonRaul states that patient is feeling a little better. Raul Napier denies that the patient is experiencing any shortness of breath and/or fevers.    -Patient's sonAby understanding of when it would be of emergent need to return to the ED. -Reviewed medications ordered by the ED provider. Patient's sonRaul states tea the Bromfed has been very effective in treating the cough and congestion. -ACM emphasized the importance to follow up with PCP. -Patient's son, Raul Napier, verbalized understanding of the CDC guidelines.  -Patient's sonRaul will refer to discharge papers to activate MyChart.  -All questions were answered during this encounter. ACM provided contact information. Plan for follow-up call in 5-7 days based on severity of symptoms and risk factors.

## 2021-12-06 NOTE — CARE COORDINATION
ACM attempted to reach patient's son and Raheel Shukla for an ED/COVID follow up call. Jodie Ventura answered the phone and requested that ACM call back a little later. Jodie Whitehead states that he is on the phone with the [facility].       PLAN:  -AC will attempt to reach patient's son, Jodie Whitehead again

## 2021-12-07 NOTE — Clinical Note
Patient Class: Observation [104]   REQUIRED: Diagnosis: Pneumonia due to COVID-19 virus [[id number]]   Estimated Length of Stay: Estimated stay of less than 2 midnights   Admitting Provider: Lennie Aden [[id number]]   Telemetry/Cardiac Monitoring Required?: Yes

## 2021-12-07 NOTE — ED NOTES
Department of Emergency Medicine  FIRST PROVIDER TRIAGE NOTE             Independent MLP           12/7/21  6:34 PM EST    Date of Encounter: 12/7/21   MRN: [id number]      HPI: Cary Solano is a 80 y.o. female who presents to the ED for Positive For Covid-19 (on friday, not getting better, low pulse ox, wants to be a DNR)       ROS: Negative for abd pain, vomiting or diarrhea. PE: Gen Appearance/Constitutional: drowsy  Pulm: CTA bilat     Initial Plan of Care: All treatment areas with department are currently occupied. Plan to order/Initiate the following while awaiting opening in ED: labs, EKG, CXR and supplemental oxygen.     Initial Plan of Care: Initiate Treatment-Testing, Proceed toTreatment Area When Bed Available for ED Attending/MLP to Continue Care    Electronically signed by RUTH Esposito CNP   DD: 12/7/21         RUTH Frias CNP  12/07/21 [id number]

## 2021-12-08 PROBLEM — U07.1 PNEUMONIA DUE TO COVID-19 VIRUS: Status: ACTIVE | Noted: 2021-01-01

## 2021-12-08 PROBLEM — U07.1 COVID-19: Status: ACTIVE | Noted: 2021-01-01

## 2021-12-08 PROBLEM — J12.82 PNEUMONIA DUE TO COVID-19 VIRUS: Status: ACTIVE | Noted: 2021-01-01

## 2021-12-08 NOTE — ED PROVIDER NOTES
80-year-old female presenting to emergency department positive for COVID-19 with complaints of increasing fatigue, low pulse ox at home less than 90%. Was seen on Friday, and diagnosed with COVID-19. Patient has previous history of brain aneurysm and has right-sided hearing loss. Patient has dry cough, sore throat, fatigue, shortness of breath with exertion patient's son is present in the room with her. States she has not been eating or drinking, and has been very fatigued at home. Patient lives alone with son and his wife assisting her with           Review of Systems   Constitutional: Positive for fatigue. HENT: Positive for hearing loss. Negative for trouble swallowing and voice change. Eyes: Negative for discharge and redness. Respiratory: Positive for cough. Cardiovascular: Negative for chest pain and leg swelling. Gastrointestinal: Positive for diarrhea. Negative for blood in stool, nausea and vomiting. Genitourinary: Negative for decreased urine volume and dysuria. Musculoskeletal: Negative for back pain and neck pain. Skin: Negative for rash and wound. Neurological: Negative for syncope, weakness, light-headedness and headaches. Psychiatric/Behavioral: Negative for behavioral problems and confusion. Physical Exam  Vitals reviewed. Constitutional:       General: She is not in acute distress. Appearance: Normal appearance. HENT:      Head: Normocephalic. Right Ear: External ear normal.      Left Ear: External ear normal.      Mouth/Throat:      Mouth: Mucous membranes are moist.   Eyes:      General: No scleral icterus. Right eye: No discharge. Left eye: No discharge. Conjunctiva/sclera: Conjunctivae normal.      Pupils: Pupils are equal, round, and reactive to light. Cardiovascular:      Rate and Rhythm: Regular rhythm. Tachycardia present. Pulses: Normal pulses. Heart sounds: Normal heart sounds. No gallop.     Pulmonary: Effort: Pulmonary effort is normal. No respiratory distress. Breath sounds: Normal breath sounds. No wheezing or rales. Abdominal:      General: Bowel sounds are normal.      Palpations: Abdomen is soft. Tenderness: There is no abdominal tenderness. There is no right CVA tenderness, left CVA tenderness, guarding or rebound. Musculoskeletal:         General: No swelling or tenderness. Cervical back: Normal range of motion and neck supple. No rigidity or tenderness. Right lower leg: No edema. Left lower leg: No edema. Skin:     General: Skin is warm and dry. Capillary Refill: Capillary refill takes less than 2 seconds. Neurological:      Mental Status: She is alert and oriented to person, place, and time. Psychiatric:         Mood and Affect: Mood normal.         Behavior: Behavior normal.          Procedures     MDM  Number of Diagnoses or Management Options  Diagnosis management comments: 1year-old female presented emergency department with complaints of increasing symptoms of COVID-19. Patient does not normally wear oxygen at home. Patient has now requiring oxygen. Son reports that when patient is walking, she her pulse ox began to fall below 90%. Spoke with hospitalist, discussed patient, will plan admit patient. Patient is stable at time of admission.                  --------------------------------------------- PAST HISTORY ---------------------------------------------  Past Medical History:  has a past medical history of Brain aneurysm. Past Surgical History:  has no past surgical history on file. Social History:  reports that she has quit smoking. She has never used smokeless tobacco. She reports previous alcohol use. She reports that she does not use drugs. Family History: family history is not on file. The patients home medications have been reviewed.     Allergies: Patient has no known allergies.     -------------------------------------------------- RESULTS -------------------------------------------------    LABS:  Results for orders placed or performed during the hospital encounter of 12/07/21   CBC Auto Differential   Result Value Ref Range    WBC 8.0 4.5 - 11.5 E9/L    RBC 5.04 3.50 - 5.50 E12/L    Hemoglobin 13.8 11.5 - 15.5 g/dL    Hematocrit 43.3 34.0 - 48.0 %    MCV 85.9 80.0 - 99.9 fL    MCH 27.4 26.0 - 35.0 pg    MCHC 31.9 (L) 32.0 - 34.5 %    RDW 13.3 11.5 - 15.0 fL    Platelets 506 504 - 248 E9/L    MPV 11.0 7.0 - 12.0 fL    Neutrophils % 86.3 (H) 43.0 - 80.0 %    Immature Granulocytes % 0.5 0.0 - 5.0 %    Lymphocytes % 5.6 (L) 20.0 - 42.0 %    Monocytes % 6.8 2.0 - 12.0 %    Eosinophils % 0.3 0.0 - 6.0 %    Basophils % 0.5 0.0 - 2.0 %    Neutrophils Absolute 6.88 1.80 - 7.30 E9/L    Immature Granulocytes # 0.04 E9/L    Lymphocytes Absolute 0.45 (L) 1.50 - 4.00 E9/L    Monocytes Absolute 0.54 0.10 - 0.95 E9/L    Eosinophils Absolute 0.02 (L) 0.05 - 0.50 E9/L    Basophils Absolute 0.04 0.00 - 0.20 E9/L    Anisocytosis 1+     Polychromasia 1+     Poikilocytes 1+     [place] Cells 1+     Ovalocytes 1+    Comprehensive Metabolic Panel   Result Value Ref Range    Sodium 136 132 - 146 mmol/L    Potassium 3.4 (L) 3.5 - 5.0 mmol/L    Chloride 94 (L) 98 - 107 mmol/L    CO2 28 22 - 29 mmol/L    Anion Gap 14 7 - 16 mmol/L    Glucose 119 (H) 74 - 99 mg/dL    BUN 29 (H) 6 - 23 mg/dL    CREATININE 1.0 0.5 - 1.0 mg/dL    GFR Non-African American 52 >=60 mL/min/1.73    GFR African American >60     Calcium 9.7 8.6 - 10.2 mg/dL    Total Protein 7.0 6.4 - 8.3 g/dL    Albumin 3.5 3.5 - 5.2 g/dL    Total Bilirubin 0.8 0.0 - 1.2 mg/dL    Alkaline Phosphatase 81 35 - 104 U/L    ALT 11 0 - 32 U/L    AST 26 0 - 31 U/L   Troponin   Result Value Ref Range    Troponin, High Sensitivity 39 (H) 0 - 9 ng/L   Troponin   Result Value Ref Range    Troponin, High Sensitivity 35 (H) 0 - 9 ng/L   EKG 12 Lead   Result Value Ref Range    Ventricular Rate 125 BPM    Atrial Rate 125 BPM    P-R Interval 120 ms    QRS Duration 66 ms    Q-T Interval 312 ms    QTc Calculation (Bazett) 450 ms    P Axis 61 degrees    R Axis 70 degrees    T Axis 69 degrees       RADIOLOGY:  XR CHEST PORTABLE   Final Result   Bi basilar pneumonia. EKG:  This EKG is signed and interpreted by me. Rate: 120  Rhythm: Sinus  Interpretation: sinus tachycardia  Comparison: changes compared to previous EKG and no previous EKG available      ------------------------- NURSING NOTES AND VITALS REVIEWED ---------------------------  Date / Time Roomed:  12/7/2021 10:09 PM  ED Bed Assignment:  24/24    The nursing notes within the ED encounter and vital signs as below have been reviewed. Patient Vitals for the past 24 hrs:   BP Temp Temp src Pulse Resp SpO2 Height Weight   12/08/21 0528 -- -- -- 82 -- 98 % -- --   12/08/21 0521 -- -- -- 77 -- 98 % -- --   12/08/21 0148 (!) 141/74 -- -- 95 16 97 % -- --   12/07/21 2257 (!) 139/94 98.7 °F (37.1 °C) Oral 106 16 97 % 5' (1.524 m) 105 lb (47.6 kg)   12/07/21 2044 (!) 158/86 -- -- 111 16 97 % -- --   12/07/21 1832 135/85 99 °F (37.2 °C) Temporal 127 16 95 % -- --       Oxygen Saturation Interpretation: Improved after treatment    ------------------------------------------ PROGRESS NOTES ------------------------------------------  Re-evaluation(s):  Patients symptoms show no change  Repeat physical examination is not changed    Counseling:  I have spoken with the patient and discussed todays results, in addition to providing specific details for the plan of care and counseling regarding the diagnosis and prognosis. Their questions are answered at this time and they are agreeable with the plan of admission.    --------------------------------- ADDITIONAL PROVIDER NOTES ---------------------------------  Consultations: Spoke with Dr. Te Alston. Discussed case. They will admit the patient.   This patient's ED course included: a personal history and physicial examination, re-evaluation prior to disposition, multiple bedside re-evaluations, IV medications, cardiac monitoring and continuous pulse oximetry    This patient has remained hemodynamically stable during their ED course. Diagnosis:  1. COVID-19        Disposition:  Patient's disposition: Admit   Patient's condition is stable.            Long Leos DO  Resident  12/08/21 1774

## 2021-12-08 NOTE — PLAN OF CARE
Assessment:   1. COVID-19 PNA  2. Elevated troponin   3. Dyspnea on exertion     Plan:   1. COVID-19 PNA: Patient was sating less than 90% at home. Placed on 2L in the ED. Currently on 2L NC. D-dimer 773. Last d-dimer on 12/3 was 947, CTA at that time was negative for PE. Started on Baricitinib. Continue Decadron, and vitamin supplementation. PT/OT consulted. 2. Elevated troponin: 39>35>24. EKG showed sinus tachycardia with premature supraventricular complexes     3. Dyspnea on exertion: Currently on 2L Continue Decadron. 4. Hypertension: Continue Toprol.      5. Hyperlipidemia: Continue Lipitor

## 2021-12-08 NOTE — PROGRESS NOTES
Admission database completed to the best of this RN's ability. Son/POA, Vee Horan, called for answers to most questions d/t pt being [facility]. 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 He also states that until recently, pt was living in [place], [place] but has moved into an apartment near the son whom helps care for her.

## 2021-12-08 NOTE — H&P
[facility] History and Physical      CHIEF COMPLAINT:  Shortness of Breath     History of Present Illness: This is a 25-year-old female with a history of brain aneurysm, hard of hearing, and diagnosed with COVID-19 on 12/03/2021. Came into ED due to shortness of breath and worsening of Covid symptoms. Patient in ED on 12/3/2021 with initial Covid symptoms and testing. Patient discharged on cough syrup and ondansetron as needed. Patient's symptoms include dry cough, sore throat, and fatigue. Patient denies chills, fever, palpitations, chest pain, and diarrhea. Patient denies anything improving her symptoms. Admits to increased shortness of breath with activity. Informant(s) for H&P: Patient and chart review. REVIEW OF SYSTEMS:  A comprehensive review of systems was negative except for: what is in the HPI      PMH:  Past Medical History:   Diagnosis Date    Brain aneurysm        Surgical History:  History reviewed. No pertinent surgical history. Medications Prior to Admission:    Prior to Admission medications    Medication Sig Start Date End Date Taking? Authorizing Provider   brompheniramine-pseudoephedrine-DM (BROMFED DM) 2-30-10 MG/5ML syrup Take 5 mLs by mouth 4 times daily as needed for Congestion or Cough 12/4/21 1/3/22  [facility] DO   ondansetron (ZOFRAN-ODT) 4 MG disintegrating tablet Take 1 tablet by mouth 3 times daily as needed for Nausea or Vomiting 12/4/21   [facility], DO       Allergies:    Patient has no known allergies. Social History:    reports that she has quit smoking. She has never used smokeless tobacco. She reports previous alcohol use. She reports that she does not use drugs. Family History:   family history is not on file. No significant family history.      PHYSICAL EXAM:  Vitals:  BP (!) 141/74   Pulse 95   Temp 98.7 °F (37.1 °C) (Oral)   Resp 16   Ht 5' (1.524 m)   Wt 105 lb (47.6 kg)   SpO2 97%   BMI 20.51 kg/m² General Appearance: alert. Oriented to person only. Confused to time and place. Knows that she is in the hospital but thinks she is in [place]. Thinks that it is June 1922. no acute distress. Skin: warm and dry  Head: normocephalic and atraumatic  Eyes: pupils equal, round, and reactive to light, conjunctivae normal  Pulmonary/Chest: Lung sounds diminished via auscultation. O2 2 L via nasal cannula on. Nonproductive cough noted. Cardiovascular: Tachycardic, normal S1 and S2 and no carotid bruits  Abdomen: soft, non-tender, non-distended, normal bowel sounds, no masses or organomegaly  Extremities: no edema  Neurologic: Oriented to person only. Speech normal.        LABS:  Recent Labs     12/07/21 2043      K 3.4*   CL 94*   CO2 28   BUN 29*   CREATININE 1.0   GLUCOSE 119*   CALCIUM 9.7       Recent Labs     12/07/21 2043   WBC 8.0   RBC 5.04   HGB 13.8   HCT 43.3   MCV 85.9   MCH 27.4   MCHC 31.9*   RDW 13.3      MPV 11.0       No results for input(s): POCGLU in the last 72 hours. CBC:   Lab Results   Component Value Date    WBC 8.0 12/07/2021    RBC 5.04 12/07/2021    HGB 13.8 12/07/2021    HCT 43.3 12/07/2021    MCV 85.9 12/07/2021    MCH 27.4 12/07/2021    MCHC 31.9 12/07/2021    RDW 13.3 12/07/2021     12/07/2021    MPV 11.0 12/07/2021     BMP:    Lab Results   Component Value Date     12/07/2021    K 3.4 12/07/2021    CL 94 12/07/2021    CO2 28 12/07/2021    BUN 29 12/07/2021    LABALBU 3.5 12/07/2021    CREATININE 1.0 12/07/2021    CALCIUM 9.7 12/07/2021    GFRAA >60 12/07/2021    LABGLOM 52 12/07/2021    GLUCOSE 119 12/07/2021       Radiology:   XR CHEST PORTABLE   Final Result   Bi basilar pneumonia. EKG: Sinus tachycardia  Nonspecific ST abnormality  Abnormal ECG  When compared with ECG of 03-DEC-2021 19:09,  premature atrial complexes are no longer present  Vent.  rate has increased BY  48 BPM  T wave amplitude has decreased in Anterolateral leads    ASSESSMENT:      Active Problems:    Pneumonia due to COVID-19 virus  Resolved Problems:    * No resolved hospital problems. *      PLAN:  Patient to ED for worsening of Covid symptoms. Positive for Covid on 12/3/2021. Symptoms include sore throat, cough, fatigue and shortness of breath. Discharged from ED on 12/3/2021 on cough syrup and Zofran. Denies any medical history. Does not take any home medication on a regular basis. Does not wear oxygen at home. 1.  Pneumonia due to Covid 19 virus-wean oxygen as tolerated. Continue Decadron daily. Will check CRP, procalcitonin, LD, ferritin, and D-dimer. Last D-dimer on 12/3/2021 947 and CRP 7.7. CTA done at that time and negative for PE. Will consult pharmacy for baricitinib. Start vitamin D, vitamin C, and zinc.  Albuterol inhaler 4 times daily for shortness of breath. 2.  Dyspnea on exertion-we will check walking pulse ox. Currently 96% on 2 L at rest.  3.  Elevated troponin-trending down. Will recheck in a.m. Code Status: Limited (DNR CCA-no intubation)  DVT prophylaxis: Lovenox      NOTE: This report was transcribed using voice recognition software. Every effort was made to ensure accuracy; however, inadvertent computerized transcription errors may be present.   Electronically signed by RUTH Rahman CNP on 12/8/2021 at 2:22 AM

## 2021-12-08 NOTE — PROGRESS NOTES
-Consulted to dose baricitinib. -Inflammatory markers elevated. -eGFR 30-60  -Patient is on supplemental oxygen, steroids, anticoagulation.   -Will start baricitinib 2 mg PO once daily for 14 days or until hospital discharge.

## 2021-12-09 NOTE — ACP (ADVANCE CARE PLANNING)
Advance Care Planning     Advance Care Planning Activator (Inpatient)  Conversation Note      Date of ACP Conversation: 12/9/2021     Conversation Conducted with: Patient with Irene 51: Named in Advance Directive or Healthcare Power of  (name) Jody Lama    ACP Activator: Urvashi Green, [address] [facility] Decision Maker:     Current Designated Health Care Decision Maker:     Primary Decision Maker: Sherita Miu - Child - [phone]  Click here to complete [address] including section of the Healthcare Decision Maker Relationship (ie \"Primary\")  Today we documented Decision Maker(s) consistent with ACP documents on file. Care Preferences    Ventilation: \"If you were in your present state of health and suddenly became very ill and were unable to breathe on your own, what would your preference be about the use of a ventilator (breathing machine) if it were available to you? \"      Would the patient desire the use of ventilator (breathing machine)?: no    \"If your health worsens and it becomes clear that your chance of recovery is unlikely, what would your preference be about the use of a ventilator (breathing machine) if it were available to you? \"     Would the patient desire the use of ventilator (breathing machine)?: No      Resuscitation  \"CPR works best to restart the heart when there is a sudden event, like a heart attack, in someone who is otherwise healthy. Unfortunately, CPR does not typically restart the heart for people who have serious health conditions or who are very sick. \"    \"In the event your heart stopped as a result of an underlying serious health condition, would you want attempts to be made to restart your heart (answer \"yes\" for attempt to resuscitate) or would you prefer a natural death (answer \"no\" for do not attempt to resuscitate)? \" no       [] Yes   [] No   Educated Patient / Decision Maker regarding differences between Advance Directives and portable DNR orders.     Length of ACP Conversation in minutes:      Conversation Outcomes:  [x] ACP discussion completed  [] Existing advance directive reviewed with patient; no changes to patient's previously recorded wishes  [] New Advance Directive completed  [] Portable Do Not Rescitate prepared for Provider review and signature  [] POLST/POST/MOLST/MOST prepared for Provider review and signature      Follow-up plan:    [] Schedule follow-up conversation to continue planning  [] Referred individual to Provider for additional questions/concerns   [] Advised patient/agent/surrogate to review completed ACP document and update if needed with changes in condition, patient preferences or care setting    [] This note routed to one or more involved healthcare providers

## 2021-12-09 NOTE — PROGRESS NOTES
[facility] Progress Note    Admitting Date and Time: 12/7/2021 10:09 PM  Admit Dx: Pneumonia due to COVID-19 virus [U07.1, J12.82]  COVID-19 [U07.1]      Assessment:    Active Problems:    Pneumonia due to COVID-19 virus    COVID-19  Resolved Problems:    * No resolved hospital problems. *      Plan:  Acute hypoxic respiratory failure  Due to COVID 919 pneumonia  O2 sats in the 80's on presentation  On 2-4 L now  Wean down on O2    COVID 19 pneumonia  Started on decadron and baricitinib  procal is not significant, no need for abx  Monitor inflammaory markers.     Elevated trop  Chronic, improving        Subjective:  Patient is being followed for Pneumonia due to COVID-19 virus [U07.1, J12.82]  COVID-19 [U07.1]   Pt feels good, breathing better    ROS: denies fever, chills, cp, n/v, HA unless stated above     sodium chloride flush  5-40 mL IntraVENous 2 times per day    dexamethasone  6 mg Oral Daily    Vitamin D  2,000 Units Oral Daily    ascorbic acid  500 mg Oral TID    zinc sulfate  50 mg Oral Daily    albuterol sulfate HFA  2 puff Inhalation 4x daily    enoxaparin  30 mg SubCUTAneous Daily    baricitinib  2 mg Oral Daily    metoprolol succinate  50 mg Oral Daily    atorvastatin  20 mg Oral Daily     sodium chloride flush, 5-40 mL, PRN  sodium chloride, 25 mL, PRN  ondansetron, 4 mg, Q8H PRN   Or  ondansetron, 4 mg, Q6H PRN  polyethylene glycol, 17 g, Daily PRN  acetaminophen, 650 mg, Q6H PRN   Or  acetaminophen, 650 mg, Q6H PRN  guaiFENesin-dextromethorphan, 5 mL, Q4H PRN         Objective:    /65   Pulse 74   Temp 98.1 °F (36.7 °C) (Oral)   Resp 18   Ht 5' (1.524 m)   Wt 105 lb (47.6 kg)   SpO2 98%   BMI 20.51 kg/m²     General Appearance: alert and oriented to person, place and time and in no acute distress  Skin: warm and dry  Head: normocephalic and atraumatic  Eyes: pupils equal, round, and reactive to light, extraocular eye movements intact, conjunctivae normal  Neck: neck supple and non tender without mass   Pulmonary/Chest: clear to auscultation bilaterally- no wheezes, rales or rhonchi, normal air movement, no respiratory distress  Cardiovascular: normal rate, normal S1 and S2 and no carotid bruits  Abdomen: soft, non-tender, non-distended, normal bowel sounds, no masses or organomegaly  Extremities: no cyanosis, no clubbing and no edema  Neurologic: no cranial nerve deficit and speech normal        Recent Labs     12/07/21 2043 12/08/21  0650    136   K 3.4* 4.0   CL 94* 96*   CO2 28 28   BUN 29* 31*   CREATININE 1.0 1.0   GLUCOSE 119* 124*   CALCIUM 9.7 9.7       Recent Labs     12/07/21 2043 12/08/21  0650   WBC 8.0 5.6   RBC 5.04 4.88   HGB 13.8 13.2   HCT 43.3 42.5   MCV 85.9 87.1   MCH 27.4 27.0   MCHC 31.9* 31.1*   RDW 13.3 13.2    228   MPV 11.0 10.1         NOTE: This report was transcribed using voice recognition software. Every effort was made to ensure accuracy; however, inadvertent computerized transcription errors may be present.   Electronically signed by Jo Rick MD on 12/9/2021 at 11:57 AM

## 2021-12-09 NOTE — PROGRESS NOTES
Physical Therapy    Facility/Department: [address] INTERNAL MEDICINE 2  Initial Assessment    NAME: Nani Zuñiga  : 1928  MRN: [id number]    Date of Service: 2021      Patient Diagnosis(es): The encounter diagnosis was COVID-19.     has a past medical history of Brain aneurysm. has a past surgical history that includes Brain aneurysm surgery. Evaluating Therapist: Rima PT      Room #:  3261/7339-X  Diagnosis:  Pneumonia due to COVID-19 virus [U07.1, J12.82]  COVID-19 [U07.1]  PMHx/PSHx:  Brain aneurysm  Precautions:  Falls, droplet plus isolation, O2 4L, very hard of hearing      Social:  Pt states she lives alone in a 1 floor plan and is independent with ww. Initial Evaluation  Date: 21 Treatment      Short Term/ Long Term   Goals   Was pt agreeable to Eval/treatment? yes     Does pt have pain? No c/o pain     Bed Mobility  Rolling: supervsion  Supine to sit: supervision  Sit to supine: supervision  Scooting: supervison  independent   Transfers Sit to stand: supervision  Stand to sit: supervision  Stand pivot: superision  independent   Ambulation    30 feet with ww with CGA  75 feet with ww indepenent   Stair Negotiation  Ascended and descended  NT      LE strength     3+/5    4-/5   balance      Fair+     AM-PAC Raw score               17/24         Pt is alert and Oriented   LE ROM: WFL  Sensation: intact  Edema: none  Endurance: fair     ASSESSMENT:    Pt displays functional ability as noted in the objective portion of this evaluation. Patient education  Pt educated on walker safety    Patient response to education:   Pt verbalized understanding Pt demonstrated skill Pt requires further education in this area   yes With cues yes       Comments:  Pt fatigued with mobility. SpO2 on 4L after ambulation 81%. Recovered to 90% with rest and cues for breathing technique. Pt unsteady with ambulation. Decreased step length and foot clearance.        Conditions Requiring Skilled Therapeutic Intervention:    [x]Decreased strength     []Decreased ROM  [x]Decreased functional mobility  [x]Decreased balance   [x]Decreased endurance   []Decreased posture  []Decreased sensation  []Decreased coordination   []Decreased vision  []Decreased safety awareness   []Increased pain       Patient and or family understand(s) diagnosis, prognosis, and plan of care. Prognosis is good for reaching above PT goals    PHYSICAL THERAPY PLAN OF CARE:    PT POC is established based on physician order and patient diagnosis     Referring provider/PT Order: Celeste Aparicio PA-C/ PT eval and treat      Current Treatment Recommendations:     [x] Strengthening to improve independence with functional mobility   [] ROM to improve independence with functional mobility   [x] Balance Training to improve static/dynamic balance and to reduce fall risk  [x] Endurance Training to improve activity tolerance during functional mobility   [x] Transfer Training to improve safety and independence with all functional transfers   [x] Gait Training to improve gait mechanics, endurance and assess need for appropriate assistive device  [] Stair Training in preparation for safe discharge home and/or into the community   [] Positioning to prevent skin breakdown and contractures  [] Safety and Education Training   [] Patient/Caregiver Education   [] HEP  [] Other     PT long term treatment goals are located in above grid    Frequency of treatments: 2-5x/week x 5 days. Time in  0845  Time out  0900        Evaluation Time includes thorough review of current medical information, gathering information on past medical history/social history and prior level of function, completion of standardized testing/informal observation of tasks, assessment of data and education on plan of care and goals.       CPT codes:  [x] Low Complexity PT evaluation 04319  [] Moderate Complexity PT evaluation 20231  [] High Complexity PT evaluation 20075  [] PT Re-evaluation [id number]  [] Gait training 15001 minutes  [] Manual therapy 33119 minutes  [] Therapeutic activities 78066 minutes  [] Therapeutic exercises 26299 minutes  [] Neuromuscular reeducation 50038 minutes     [facility] PSYCHIATRY PT [id number]

## 2021-12-09 NOTE — PROGRESS NOTES
Consult for non blanchable redness to coccyx  Per staff 1.4x1.4 non blanchable area. mepilex in place. [address]  covid positive  Continue SOS and monitor as needed  Raiza Bond.  Antonette Gardner, CNS, Wound Care

## 2021-12-09 NOTE — PROGRESS NOTES
Attempted to ambulate patient with pulse ox. She state that she doesn't want to do that right now. However patient does get up and go to the rest room on her own without significant changes to her oxygen level.

## 2021-12-09 NOTE — PROGRESS NOTES
Occupational Therapy  OCCUPATIONAL THERAPY INITIAL EVALUATION      Date:2021  Patient Name: Sarahi Dubois  MRN: [id number]  : 1928  Room: 7910/5242-N    OCCUPATIONAL THERAPY INITIAL EVALUATION    [facility] FACILITY - [facility], [place]         [id number]                                                  Patient Name: Sarahi Dubois    MRN: [id number]    : 1928    Room: 9620/3757-T      Evaluating OT: Cheryl Dubon OTR/L   [id number]      Referring Provider:Jeanine Singh PA-C    Specific Provider Orders/Date:OT eval and treat 2021      Diagnosis:  Pneumonia due to COVID-19 virus [U07.1, J12.82]  COVID-19 [U07.1]     Pertinent Medical History: brain aneurysm     Precautions:  Fall Risk, [place], DROPLET PLUS, O2     Assessment of current deficits    [x] Functional mobility  [x]ADLs  [x] Strength               []Cognition    [x] Functional transfers   [x] IADLs         [x] Safety Awareness   [x]Endurance    [] Fine Coordination              [x] Balance      [] Vision/perception   []Sensation     []Gross Motor Coordination  [] ROM  [] Delirium                   [] Motor Control     OT PLAN OF CARE   OT POC based on physician orders, patient diagnosis and results of clinical assessment    Frequency/Duration  2-4 days/wk for 2 weeks PRN   Specific OT Treatment Interventions to include:   ADL retraining/adapted techniques and AE recommendations to increase functional independence within precautions                    Energy conservation techniques to improve tolerance for selfcare routine   Functional transfer/mobility training/DME recommendations for increased independence, safety and fall prevention         Patient/family education to increase safety and functional independence             Environmental modifications for safe mobility and completion of ADLs                             Therapeutic activity to improve functional performance during ADLs. Therapeutic exercise to improve tolerance and functional strength for ADLs    Balance retraining/tolerance tasks for facilitation of postural control with dynamic challenges during ADLs . Positioning to improve functional independence      Recommended Adaptive Equipment: TBD     Home Living: Pt lives alone, 1 story, steps to enter  Son lives nearby and assists as needed   Bathroom setup: walk in shower     Prior Level of Function: assist as needed  with ADLs , assist  with IADLs; ambulated with no walker    Pain Level: no pain ;   Cognition: A&O: pleasant and following commands    Memory:  Good    Sequencing:  Good    Problem solving:  Fair +    Judgement/safety:  Fair +     Functional Assessment:  AM-PAC Daily Activity Raw Score: 17/24   Initial Eval Status  Date: 12/9/21 Treatment Status  Date: STGs = LTGs  Time frame: 10-14 days   Feeding Independent      Grooming Set-up   Independent    UB Dressing Set-up   Independent    LB Dressing Min A  Mod i   Bathing Mi   Mod I    Toileting SBA  Mod I    Bed Mobility  Supervision   Supine to sit  Mod I    Functional Transfers Supervision   Sit-stand from bed  Mod I    Functional Mobility CGA,w/walker, O2   Ambulating in room   Mod I  with good tolerance    Balance Sitting:     Static:  Independent     Dynamic:SBA   Standing: CGA/SBA   Independent/supervision    Activity Tolerance Fair with light activity   lowest O2 81%   Educated and encouraged deep breathing tech   Improved 91%  Good  with ADL activity    Visual/  Perceptual Glasses: reading                 Hand Dominance righ t   AROM (PROM) Strength Additional Info:    RUE  WFL WFL good  and wfl FMC/dexterity noted during ADL tasks     LUE WFL WFL good  and wfl FMC/dexterity noted during ADL tasks       Hearing: WFL  Sensation:  No c/o numbness or tingling   Tone: WFL   Edema: none observed     Comments: Upon arrival patient lying in bed . At end of session, patient returned to bed  with call light and phone within reach, all lines and tubes intact. Overall patient demonstrated  decreased independence and safety during completion of ADL/functional transfer/mobility tasks. Pt would benefit from continued skilled OT to increase safety and independence with completion of ADL/IADL tasks for functional independence and quality of life. Rehab Potential: good for established goals     Patient / Family Goal: return home       Patient and/or family were instructed on functional diagnosis, prognosis/goals and OT plan of care. Demonstrated fair +  understanding. Eval Complexity: Low    Time In: 0855  Time Out: 0909      Min Units   OT Eval Low 97165 x  1   OT Eval Medium 08889      OT Eval High 18178      OT Re-Eval [id number]       Therapeutic Ex [id number]       Therapeutic Activities 67971       ADL/Self Care 88 [phone] 24 60       Orthotic Management 34670       Manual 56498     Neuro Re-Ed 94227       Non-Billable Time          Evaluation Time additionally includes thorough review of current medical information, gathering information on past medical history/social history and prior level of function, interpretation of standardized testing/informal observation of tasks, assessment of data and development of plan of care and goals.             Redell Falling  OTR/L  OT [id number]

## 2021-12-09 NOTE — CARE COORDINATION
Social work / Discharge Planning:       [address]. Social work spoke to patient's son/ Jeanine Humphries [phone] for initial assessment. The patient recently moved from [place] to [place]. She lives alone in an apartment with her son living nearby. He sees her daily and offers any assistance needed in the home. The patient ambulates independently using no DME. She used home care in the past in [facility] and has no history of [facility]. Patient is currently using oxygen which she does not have at home. If oxygen is needed for discharge, the son has no preference for DME provider. PT/OT evaluations ordered. Patient is going to establish with Dr. Gisel Bajwa when she is discharged. She had an appointment scheduled and had to cancel due to covid diagnosis. Will need pulse ox testing when discharge is anticipated. The Plan for Transition of Care is related to the following treatment goals: DME    The Patient and/or patient representative Rosemarie Garcia was provided with a choice of provider and agrees   with the discharge plan. [x] Yes [] No    Freedom of choice list was provided with basic dialogue that supports the patient's individualized plan of care/goals, treatment preferences and shares the quality data associated with the providers.  [x] Yes [] No   Electronically signed by MARY White on 12/9/2021 at 10:28 AM

## 2021-12-10 NOTE — PROGRESS NOTES
Pt found on floor after being notified of increased heart rate by CMR. Pt noted to be lying on her left side with oxygen tubing stretched and between her ankles. Multiple staff assisted patient back to bed. Vitals taken, EKG obtained and full assessment completed; CNM & physician on call notified. Pt requested to go to the bathroom and was assisted with 2 staff members; heart rate increased to 130's & oxygen desaturated to 80% on 15 L HFNC. Pt quickly recovered to 93% SPO2 & HR in hte 80's once returned to bed. Pt noted to have a red adis to her right shoulder and C/O of pain to her right shoulder, left ankle & left hip. Xin Dunne at bed side. See new orders. Pt is independent at home and was previously a low fall risk.

## 2021-12-10 NOTE — CARE COORDINATION
COVID + 12/3. Report per nursing that pt fell in room last night. Awaiting PT to re-evaluate per physician request. Currently requiring O2 15 liters high flow NC- referral made to [facility] to follow- will need O2 testing/ order if unable to wean off at discharge. Patient still needs to have an initial visit with Dr. Jelly Leiva on discharge- after initial visit, PCP office can set up [address] if needed. From home alone PTA- son assists as needed. VM left w/ son Suhail Clayton [phone] to discuss discharge planning- await return call. Will follow Negro Mcdonald RN case manager    2798: Phone conversation w/ son Suhail Clayton [phone]. Discharge planning discussed. Plan is for pt to return home on discharge- Suhail Clayton states that family members check on her frequently and that she would not be agreeable to go to a [facility]. No HHC preference. Referral made to [facility]- they will check if pt is active with PCP Dr. Jelly LeivaGRADALANIS [facility] order is on chart. Negro Mcdonald RN case manager      8736: PT am-pac 10. Spoke again w/ son Suhail Clayton- him and his wife Socorro Morton are now considering discharge to [facility] emailed to Suhail Clayton @ [email or web address]- awaiting [facility] choices. They are also requesting a Palliative Care consult- nursing notified. Negro Mcdonald RN case manager    1260: Dr. Jelly Leiva will not follow [address] orders as pt has not established with him yet. Patient will not be eligible for HHC on discharge- son and DIL notified Negro Mcdonald RN case manager      The Plan for Transition of Care is related to the following treatment goals: physical conditioning    The Patient and/or patient representative Rosio Pill was provided with a choice of provider and agrees   with the discharge plan. [x] Yes [] No    Freedom of choice list was provided with basic dialogue that supports the patient's individualized plan of care/goals, treatment preferences and shares the quality data associated with the providers.  [x] Yes [] No

## 2021-12-10 NOTE — PROGRESS NOTES
Physical Therapy  Facility/Department: [address] INTERNAL MEDICINE 2  Daily Treatment Note  NAME: Nani Zuñiga  : 1928  MRN: [id number]    Date of Service: 12/10/2021    Patient Diagnosis(es): The encounter diagnosis was COVID-19.     has a past medical history of Brain aneurysm. has a past surgical history that includes Brain aneurysm surgery. Evaluating Therapist: Ella Blanton PT        Room #:  5630/4127-K  Diagnosis:  Pneumonia due to COVID-19 virus [U07.1, J12.82]  COVID-19 [U07.1]  PMHx/PSHx:  Brain aneurysm  Precautions:  Falls, droplet plus isolation, O2 4L, very hard of hearing        Social:  Pt states she lives alone in a 1 floor plan and is independent with ww.        Initial Evaluation  Date: 21 Treatment     12/10/21 Short Term/ Long Term   Goals   Was pt agreeable to Eval/treatment? yes Pt nonverbal and not responding to questions      Does pt have pain? No c/o pain No indication of pain      Bed Mobility  Rolling: supervsion  Supine to sit: supervision  Sit to supine: supervision  Scooting: supervison  supine to sit mod to max assist  Scooting mod to max assist independent   Transfers Sit to stand: supervision  Stand to sit: supervision  Stand pivot: superision  sit <> stand mod assist independent   Ambulation    30 feet with ww with CGA  attempted, pt unable to move LE's once standing 75 feet with ww indepenent   Stair Negotiation  Ascended and descended  NT       LE strength     3+/5     4-/5   balance      Fair+       AM-PAC Raw score               17/24  10/24          Patient education  Pt was educated on importance of mobility    Patient response to education:   Pt verbalized understanding Pt demonstrated skill Pt requires further education in this area   no    yes     Additional Comments/treatment: Pt nonverbal throughout session, blank stare, not tracking. Pt not following verbal commands but did follow tactile commands to sit and stand.   No attempted to move UE's or LE's or to hold against gravity. However was able to support self in sitting and assist with standing Nursing notified of change in status since PT evaluation yesterday. Pt was left in bed with call light left by patient. Chair/bed alarm: yes    Time in: 1110  Time out: 1135    Total treatment time 25 minutes    Pt with decline in functional status. Continue with physical therapy current plan of care.     Beverley Arambula PT [id number]      CPT codes:  [] Low Complexity PT evaluation 49433  [] Moderate Complexity PT evaluation 56921  [] High Complexity PT evaluation 83293  [] PT Re-evaluation 09110  [] Gait training 02860  minutes  [] Manual therapy 16638    minutes  [x] Therapeutic activities 06007  25  minutes  [] Therapeutic exercises 62938     minutes  [] Neuromuscular reeducation 21476     minutes

## 2021-12-10 NOTE — PROGRESS NOTES
distress  Skin: warm and dry  Head: normocephalic and atraumatic  Eyes: pupils equal, round, and reactive to light, extraocular eye movements intact, conjunctivae normal  Neck: neck supple and non tender without mass   Pulmonary/Chest: clear to auscultation bilaterally- no wheezes, rales or rhonchi, normal air movement, no respiratory distress  Cardiovascular: normal rate, normal S1 and S2 and no carotid bruits  Abdomen: soft, non-tender, non-distended, normal bowel sounds, no masses or organomegaly  Extremities: no cyanosis, no clubbing and no edema  Neurologic: no cranial nerve deficit and speech normal        Recent Labs     12/07/21 2043 12/08/21  0650 12/09/21  1248    136 136   K 3.4* 4.0 3.8   CL 94* 96* 95*   CO2 28 28 29   BUN 29* 31* 45*   CREATININE 1.0 1.0 1.0   GLUCOSE 119* 124* 141*   CALCIUM 9.7 9.7 10.1       Recent Labs     12/07/21 2043 12/08/21  0650 12/09/21  1248   WBC 8.0 5.6 11.9*   RBC 5.04 4.88 4.84   HGB 13.8 13.2 13.3   HCT 43.3 42.5 41.2   MCV 85.9 87.1 85.1   MCH 27.4 27.0 27.5   MCHC 31.9* 31.1* 32.3   RDW 13.3 13.2 13.2    228 302   MPV 11.0 10.1 10.6         NOTE: This report was transcribed using voice recognition software. Every effort was made to ensure accuracy; however, inadvertent computerized transcription errors may be present.   Electronically signed by Ansley Andrews MD on 12/10/2021 at 7:37 AM

## 2021-12-10 NOTE — PROGRESS NOTES
Occupational Therapy  OT BEDSIDE TREATMENT NOTE      Date:12/10/2021  Patient Name: Antony Chowdary  MRN: [id number]  : 1928  Room: [address] [place] [address]     Evaluating OT: Jacqueline Sterling OTR/L   [id number]       Referring Provider:Jeanine Covarrubias PA-C    Specific Provider Orders/Date:OT eval and treat 2021       Diagnosis:  Pneumonia due to COVID-19 virus [U07.1, J12.82]  COVID-19 [U07.1]     Pertinent Medical History: brain aneurysm     Precautions:  Fall Risk, [place], DROPLET PLUS, O2      Assessment of current deficits    [x]? Functional mobility            [x]?ADLs           [x]? Strength                  []?Cognition    [x]? Functional transfers          [x]? IADLs         [x]? Safety Awareness   [x]? Endurance    []? Fine Coordination                         [x]? Balance      []? Vision/perception   []? Sensation      []? Gross Motor Coordination             []? ROM           []? Delirium                   []? Motor Control      OT PLAN OF CARE   OT POC based on physician orders, patient diagnosis and results of clinical assessment     Frequency/Duration  2-4 days/wk for 2 weeks PRN   Specific OT Treatment Interventions to include:   ADL retraining/adapted techniques and AE recommendations to increase functional independence within precautions                    Energy conservation techniques to improve tolerance for selfcare routine   Functional transfer/mobility training/DME recommendations for increased independence, safety and fall prevention         Patient/family education to increase safety and functional independence             Environmental modifications for safe mobility and completion of ADLs                             Therapeutic activity to improve functional performance during ADLs.                                          Therapeutic exercise to improve tolerance and functional strength for ADLs    Balance retraining/tolerance tasks for facilitation of postural control with dynamic challenges during ADLs .       Positioning to improve functional independence        Recommended Adaptive Equipment: TBD      Home Living: Pt lives alone, 1 story, steps to enter  Son lives nearby and assists as needed   Bathroom setup: walk in shower      Prior Level of Function: assist as needed  with ADLs , assist  with IADLs; ambulated with no walker     Pain Level: no pain ;   Cognition: A&O: pleasant and following commands               Memory:  Good               Sequencing:  Good               Problem solving:  Fair +               Judgement/safety:  Fair +                Functional Assessment:  AM-PAC Daily Activity Raw Score: 10/24    Initial Eval Status  Date: 12/9/21 Treatment Status  Date:12/10/21 STGs = LTGs  Time frame: 10-14 days   Feeding Independent        Grooming Set-up    Independent    UB Dressing Set-up  Doctor requested to see patient this date - upon entry patient nonverbal, with a blanl  stare or follwing commands     When asked to move her arms patient made no attempts to actively move -   No active participation in ADL activity   Independent    LB Dressing Min A   Mod i   Bathing Mi    Mod I    Toileting SBA   Mod I    Bed Mobility  Supervision   Supine to sit Mod A    assisted up into long sitting position - patient able to hold herself up , using UEs to help support     Patient not following verbal commands but when physically cued/prompted  to move - patient engaged (inconsistently) Mod I    Functional Transfers Supervision   Sit-stand from bed Mod A  Sit-stand from bed     Mod I    Functional Mobility CGA,w/walker, O2   Ambulating in room   made no attempts to initiate any side steps  Mod I  with good tolerance    Balance Sitting:     Static:  Independent     Dynamic:SBA   Standing: CGA/SBA    Independent/supervision    Activity Tolerance Fair with light activity   lowest O2 81%   Educated and encouraged deep breathing tech   Improved 91%  nurse made aware of change in patients status from initial eval  Questionable in  patient willingness to participate/cooperate     Vitals   /82  HR 80s  O2 sats 90s Good  with ADL activity    Visual/  Perceptual Glasses: reading        Education:   Importance of OOB activity     Comments: Upon arrival pt lying in bed . At end of session returned to bed  all lines and tubes intact, call light within reach. ALARM  ON       · Pt has made fair -  progress towards set goals.    · Continue with current plan of care  ·   Time in: 1106  Time out:1125                Treatment Charges: Mins Units   Ther Ex  12757     Manual Therapy [phone]     Thera Activities 57606 12 1   ADL/Home Mgt [id number]     Neuro Re-ed 36030     Group Therapy      Orthotic manage/training  [id number]     Non-Billable Time     Total Timed Treatment            Dipti Ortez OTR/L [id number]

## 2021-12-10 NOTE — PLAN OF CARE
Problem: Falls - Risk of:  Goal: Will remain free from falls  Description: Will remain free from falls  Outcome: Not Met This Shift     Problem: Falls - Risk of:  Goal: Absence of physical injury  Description: Absence of physical injury  Outcome: Not Met This Shift

## 2021-12-11 NOTE — CONSULTS
Palliative Care Department  [phone]  Palliative Care Initial Consult  Provider Osbaldo Barnett MD      PATIENT: Irena Momin  : 1928  MRN: [id number]  ADMISSION DATE: 2021 10:09 PM  Referring Provider: Ora Augustine MD    Palliative Medicine was consulted on hospital day 3 for assistance with Goals of care, Family supportt     HPI:     Breezy Cortez is a 80 y.o. y/o female with a history of brain aneurysm, hard of hearing who presented to [facility]) on 2021 with  Shortness of Breath. ASSESSMENT/PLAN:     Pertinent Hospital Diagnoses      COVID 19 pneumonia    Palliative Care Encounter / Counseling Regarding Goals of Care  Please see detailed goals of care discussion as below   At this time, Nani Zuñiga, Does Not have capacity for medical decision-making due to having difficulty in hearing.  Outcome of goals of care meeting: Had a discussion with Sally Thurman who is POA, patient does not want to continue surviving if her condition continues worsening. If she requires more oxygen. He would like to just transition her to comfort focused care, eventually make her DNR CC. At this time we decided  to keep her DNR CCA/DNI.  Code status Limited DNR CCA/DNI    Advanced Directives: POA or living will in [facility]   Surrogate/Legal NOK: Avtar Zuñiga,[phone], Son/POA    Spiritual assessment: no spiritual distress identified  Bereavement and grief: to be determined  Referrals to: none today    Thank you for the opportunity to participate in the care of Nani Zuñiga. Osbaldo Barnett MD  Palliative Medicine     SUBJECTIVE:     Details of Conversation: Spoke with patient, she is very hard of hearing. Spoke with POA regarding her care, she mentioned multiple times to him. If she is ever terminal, to let her pass peacefully and naturally. I explained Covid to him, and the complications that it causes.   We spoke about her oxygen requirements, however went from 2 L to 15 L. Spoke to him regarding if this continues to increase, we know she is not doing well. He also agrees that if she requires more oxygen, the viruses getting worse. He would want for her to remain comfortable, and to pass away peacefully and naturally. We will continue monitoring her oxygenation, he does not want to pursue any aggressive measures for her. Comfort and support were provided    Prognosis: Guarded    OBJECTIVE:     BP (!) 150/86   Pulse 95   Temp 97.4 °F (36.3 °C) (Axillary)   Resp 18   Ht 5' (1.524 m)   Wt 105 lb (47.6 kg)   SpO2 98%   BMI 20.51 kg/m²     Due to the current efforts to prevent transmission of COVID-19 and also the need to preserve PPE for other caregivers, a face-to-face encounter with the patient was not performed. That being said, all relevant records and diagnostic tests were reviewed, including laboratory results and imaging. Please reference any relevant documentation elsewhere. Care will be coordinated with the primary service and other specialties as appropriate. Objective data reviewed: labs, images, records, medication use, vitals and chart    Time/Communication  Greater than 50% of time spent, total 60 minutes in counseling and coordination of care at the bedside regarding goals of care. Thank you for allowing Palliative Medicine to participate in the care of Nani Zuñiga. Note: This report was completed using computerize voiced recognition software. Every effort has been made to ensure accuracy; however, inadvertent computerized transcription errors may be present.

## 2021-12-11 NOTE — PROGRESS NOTES
Patient alert, oriented and very pleasant today. She was not on the non rebreather today and her oxygen sat stayed in the 90's. She was cooperative and took all medications today. Her son came to visit and again asked what it would take to get her to \"the end\", I explained that she is doing better today and we will not be hastening any decline. He expressed displeasure with that answer stating that in the past she has told him she doesn't want to live and that if she is released to home he fears she will be back in the hospital again soon. I explained again that she is not close to death today and that he will be kept informed if that should change.

## 2021-12-12 NOTE — PROGRESS NOTES
[facility] Progress Note    Admitting Date and Time: 12/7/2021 10:09 PM  Admit Dx: Pneumonia due to COVID-19 virus [U07.1, J12.82]  COVID-19 [U07.1]      Assessment:    Active Problems:    Pneumonia due to COVID-19 virus    COVID-19  Resolved Problems:    * No resolved hospital problems. *      Plan:  Acute hypoxic respiratory failure  Due to COVID 919 pneumonia  O2 sats in the 80's on presentation  Was on 15 L now  Weaned down to 8 L  Wean down on O2    COVID 19 pneumonia  Started on decadron and baricitinib  procal is not significant, no need for abx  Decadron to 10 mg IV bid  Monitor inflammaory markers. Elevated trop  Chronic, improving    Fall  Mechanical  PTOT    Acute metabolic encephalopathy  Might be due to COVID and steroids  Consider reducing the steroid dose.         Subjective:  Patient is being followed for Pneumonia due to COVID-19 virus [U07.1, J12.82]  COVID-19 [U07.1]   Pt is stable on 8 L    ROS: denies fever, chills, cp, n/v, HA unless stated above     dexamethasone  10 mg IntraVENous Q12H    sodium chloride flush  5-40 mL IntraVENous 2 times per day    Vitamin D  2,000 Units Oral Daily    ascorbic acid  500 mg Oral TID    zinc sulfate  50 mg Oral Daily    albuterol sulfate HFA  2 puff Inhalation 4x daily    enoxaparin  30 mg SubCUTAneous Daily    baricitinib  2 mg Oral Daily    metoprolol succinate  50 mg Oral Daily    atorvastatin  20 mg Oral Daily     sodium chloride flush, 5-40 mL, PRN  sodium chloride, 25 mL, PRN  ondansetron, 4 mg, Q8H PRN   Or  ondansetron, 4 mg, Q6H PRN  polyethylene glycol, 17 g, Daily PRN  acetaminophen, 650 mg, Q6H PRN   Or  acetaminophen, 650 mg, Q6H PRN  guaiFENesin-dextromethorphan, 5 mL, Q4H PRN         Objective:    /88   Pulse 81   Temp 97.5 °F (36.4 °C) (Oral)   Resp 16   Ht 5' (1.524 m)   Wt 105 lb (47.6 kg)   SpO2 92%   BMI 20.51 kg/m²     General Appearance: alert and oriented to person, place and time and in no acute distress  Skin: warm and dry  Head: normocephalic and atraumatic  Eyes: pupils equal, round, and reactive to light, extraocular eye movements intact, conjunctivae normal  Neck: neck supple and non tender without mass   Pulmonary/Chest: clear to auscultation bilaterally- no wheezes, rales or rhonchi, normal air movement, no respiratory distress  Cardiovascular: normal rate, normal S1 and S2 and no carotid bruits  Abdomen: soft, non-tender, non-distended, normal bowel sounds, no masses or organomegaly  Extremities: no cyanosis, no clubbing and no edema  Neurologic: no cranial nerve deficit and speech normal        Recent Labs     12/09/21  1248 12/10/21  0717    137   K 3.8 3.6   CL 95* 96*   CO2 29 28   BUN 45* 43*   CREATININE 1.0 1.1*   GLUCOSE 141* 109*   CALCIUM 10.1 9.8       Recent Labs     12/09/21  1248 12/10/21  0717   WBC 11.9* 11.8*   RBC 4.84 4.74   HGB 13.3 13.0   HCT 41.2 40.4   MCV 85.1 85.2   MCH 27.5 27.4   MCHC 32.3 32.2   RDW 13.2 13.2    303   MPV 10.6 10.7         NOTE: This report was transcribed using voice recognition software. Every effort was made to ensure accuracy; however, inadvertent computerized transcription errors may be present.   Electronically signed by Samir Montgomery MD on 12/12/2021 at 7:38 AM

## 2021-12-13 NOTE — CONSULTS
file    Emotionally Abused: Not on file    Physically Abused: Not on file    Sexually Abused: Not on file   Housing Stability:     Unable to Pay for Housing in the Last Year: Not on file    Number of Places Lived in the Last Year: Not on file    Unstable Housing in the Last Year: Not on file       Family History:  History reviewed. No pertinent family history. Medications:     sodium chloride        dexamethasone  10 mg IntraVENous Q12H    sodium chloride flush  5-40 mL IntraVENous 2 times per day    Vitamin D  2,000 Units Oral Daily    ascorbic acid  500 mg Oral TID    zinc sulfate  50 mg Oral Daily    albuterol sulfate HFA  2 puff Inhalation 4x daily    enoxaparin  30 mg SubCUTAneous Daily    baricitinib  2 mg Oral Daily    metoprolol succinate  50 mg Oral Daily    atorvastatin  20 mg Oral Daily         Vitals:    VITALS:  BP (!) 153/81   Pulse 88   Temp 97.4 °F (36.3 °C) (Axillary)   Resp 20   Ht 5' (1.524 m)   Wt 105 lb (47.6 kg)   SpO2 92%   BMI 20.51 kg/m²   24HR INTAKE/OUTPUT:      Intake/Output Summary (Last 24 hours) at 2021 1247  Last data filed at 2021 2018  Gross per 24 hour   Intake 10 ml   Output --   Net 10 ml     CURRENT PULSE OXIMETRY:  SpO2: 92 %  24HR PULSE OXIMETRY RANGE:  SpO2  Av.6 %  Min: 83 %  Max: 97 %      EXAM:  General: No distress. Eyes: PERRL. No sclera icterus. No conjunctival injection. ENT: No discharge. Pharynx clear. Neck: Trachea midline. Normal thyroid. Resp: No accessory muscle use. No crackles. No wheezing. No rhonchi. CV: Regular rate. Regular rhythm. No mumur or rub. ABD: Non-tender. Non-distended. No masses. No organmegaly. Normal bowel sounds. Skin: Warm and dry. No nodule on exposed extremities. No rash on exposed extremities. Lymph: No cervical LAD. No supraclavicular LAD. Ext: No joint deformity. No clubbing. No cyanosis. No edema  Neuro: Awake.  Follows commands      Lab Results:  CBC: No results for input(s): WBC, HGB, HCT, MCV, PLT in the last 72 hours. BMP:   Recent Labs     12/12/21  0620      K 4.5   CL 94*   CO2 28   BUN 56*   CREATININE 1.0     LFT:   Recent Labs     12/12/21  0620   ALKPHOS 69   ALT 15   AST 31   PROT 6.6   BILITOT 0.5   LABALBU 3.1*     PT/INR: No results for input(s): PROTIME, INR in the last 72 hours. Cultures:  No results for input(s): CULTRESP in the last 72 hours. ABG:   No results for input(s): PH, PO2, PCO2, HCO3, BE, O2SAT in the last 72 hours. Films:  XR CHEST PORTABLE    Result Date: 12/7/2021  EXAMINATION: ONE XRAY VIEW OF THE CHEST 12/7/2021 6:53 pm COMPARISON: December 3, 2021 HISTORY: ORDERING SYSTEM PROVIDED HISTORY: SOB TECHNOLOGIST PROVIDED HISTORY: Reason for exam:->SOB FINDINGS: Development of bi basilar infiltrates compatible with bi basilar pneumonia since the study of December 3. Heart is normal size. The mediastinum appear unremarkable. There is no perihilar vascular congestion. Bi basilar pneumonia. Assessment:        Plan: Thank you for allowing us to see this patient in consultation. Please contact us with any questions.       Electronically signed by RUTH Amaya CNP on 12/13/2021 at 12:47 PM

## 2021-12-13 NOTE — PROGRESS NOTES
[facility] Progress Note    Admitting Date and Time: 12/7/2021 10:09 PM  Admit Dx: Pneumonia due to COVID-19 virus [U07.1, J12.82]  COVID-19 [U07.1]      Assessment:    Active Problems:    Pneumonia due to COVID-19 virus    COVID-19  Resolved Problems:    * No resolved hospital problems. *      Plan:  Acute hypoxic respiratory failure  Due to COVID 919 pneumonia  O2 sats in the 80's on presentation  Was on 15 L down to 8, and this morning, worsening hypoxia  O2 14L  Wean down on O2    COVID 19 pneumonia  Started on decadron and baricitinib  procal is not significant, no need for abx  Decadron to 10 mg IV bid  Monitor inflammaory markers. Elevated trop  Chronic, improving    Fall  Mechanical  PTOT    Acute metabolic encephalopathy  Might be due to COVID and steroids  Consider reducing the steroid dose.   worsened         Subjective:  Patient is being followed for Pneumonia due to COVID-19 virus [U07.1, J12.82]  COVID-19 [U07.1]   Pt is ok, no complaints, yet, worsening hypoxia    ROS: denies fever, chills, cp, n/v, HA unless stated above     dexamethasone  10 mg IntraVENous Q12H    sodium chloride flush  5-40 mL IntraVENous 2 times per day    Vitamin D  2,000 Units Oral Daily    ascorbic acid  500 mg Oral TID    zinc sulfate  50 mg Oral Daily    albuterol sulfate HFA  2 puff Inhalation 4x daily    enoxaparin  30 mg SubCUTAneous Daily    baricitinib  2 mg Oral Daily    metoprolol succinate  50 mg Oral Daily    atorvastatin  20 mg Oral Daily     sodium chloride flush, 5-40 mL, PRN  sodium chloride, 25 mL, PRN  ondansetron, 4 mg, Q8H PRN   Or  ondansetron, 4 mg, Q6H PRN  polyethylene glycol, 17 g, Daily PRN  acetaminophen, 650 mg, Q6H PRN   Or  acetaminophen, 650 mg, Q6H PRN  guaiFENesin-dextromethorphan, 5 mL, Q4H PRN         Objective:    BP (!) 146/65   Pulse 77   Temp 97.3 °F (36.3 °C) (Oral)   Resp 18   Ht 5' (1.524 m)   Wt 105 lb (47.6 kg)   SpO2 97%   BMI 20.51 kg/m² General Appearance: alert and oriented to self and in no acute distress  Skin: warm and dry  Head: normocephalic and atraumatic  Eyes: pupils equal, round, and reactive to light, extraocular eye movements intact, conjunctivae normal  Neck: neck supple and non tender without mass   Pulmonary/Chest: clear to auscultation bilaterally- no wheezes, rales or rhonchi, normal air movement, no respiratory distress  Cardiovascular: normal rate, normal S1 and S2 and no carotid bruits  Abdomen: soft, non-tender, non-distended, normal bowel sounds, no masses or organomegaly  Extremities: no cyanosis, no clubbing and no edema  Neurologic: no cranial nerve deficit and speech normal        Recent Labs     12/12/21  0620      K 4.5   CL 94*   CO2 28   BUN 56*   CREATININE 1.0   GLUCOSE 141*   CALCIUM 9.7       No results for input(s): WBC, RBC, HGB, HCT, MCV, MCH, MCHC, RDW, PLT, MPV in the last 72 hours. NOTE: This report was transcribed using voice recognition software. Every effort was made to ensure accuracy; however, inadvertent computerized transcription errors may be present.   Electronically signed by Rudy Dooley MD on 12/13/2021 at 8:48 AM

## 2021-12-13 NOTE — PROGRESS NOTES
Physical Therapy    Facility/Department: [address] INTERNAL MEDICINE 2  Daily treatment    NAME: Nani Zuñiga  : 1928  MRN: [id number]    Date of Service: 2021      Patient Diagnosis(es): The encounter diagnosis was COVID-19.     has a past medical history of Brain aneurysm. has a past surgical history that includes Brain aneurysm surgery. Evaluating Therapist: Rupa Morrison PT        Room #:  0423/0423-A   [place] due to COVID-19 virus [U07.1, J12.82]  COVID-19 [U07.1]  PMHx/PSHx:  Brain aneurysm  Precautions:  Falls, droplet plus isolation, O2 4L, very hard of hearing        Social:  Pt states she lives alone in a 1 floor plan and is independent with ww.        Initial Evaluation  Date: 21 Treatment     21 Short Term/ Long Term   Goals   Was pt agreeable to Eval/treatment? yes yes     Does pt have pain? No c/o pain No c/o of pain      Bed Mobility  Rolling: supervsion  Supine to sit: supervision  Sit to supine: supervision  Scooting: supervison Supine to sit SBA  Scooting SBA independent   Transfers Sit to stand: supervision  Stand to sit: supervision  Stand pivot: superision  sit <> stand min assist independent   Ambulation    30 feet with ww with CGA 40 feet and 20 feet with ww min assist 75 feet with ww indepenent   Stair Negotiation  Ascended and descended  NT       LE strength     3+/5     4-/5   balance      Fair+       AM-PAC Raw score                         Patient education  Pt was educated on transfer technqiue    Patient response to education:   Pt verbalized understanding Pt demonstrated skill Pt requires further education in this area   no With assist yes     Additional Comments: Pt on 10L O2. Pt had NRB mask off upon arrival to room and was 94%. Once standing pt incontinent of bladder. Pt ambulated to bathroom and was assisted with hygiene. After walking back to sit on bed. SpO2on 10L 75%. NRB mask added and pt recovered back to 96%.  Pt unsteady with ambulation, high risk for falls. OT arrived at end of PT session. Pt left sitting up edge of bed with OT in room. Time in: 0930  Time out: 0953    Total treatment time 23 minutes    Pt is making  progress toward established Physical Therapy goals. Continue with physical therapy current plan of care.     Beverley Arambula PT [id number]      CPT codes:  [] Low Complexity PT evaluation 35454  [] Moderate Complexity PT evaluation 64977  [] High Complexity PT evaluation 02830  [] PT Re-evaluation 96099  [] Gait training 95819  minutes  [] Manual therapy 68782    minutes  [x] Therapeutic activities 96416  23  minutes  [] Therapeutic exercises 08413     minutes  [] Neuromuscular reeducation 42953     minutes

## 2021-12-13 NOTE — PROGRESS NOTES
Occupational Therapy  OT BEDSIDE TREATMENT NOTE      Date:2021  Patient Name: Gudelia Pérez  MRN: [id number]  : 1928  Room: [address] [place] [address]     Per OT Eval:  Evaluating OT: Marilee Harrison OTR/L   [id number]       Referring Provider:Jeanine Snyder PA-C    Specific Provider Orders/Date:OT eval and treat 2021       Diagnosis:  Pneumonia due to COVID-19 virus [U07.1, J12.82]  COVID-19 [U07.1]     Pertinent Medical History: brain aneurysm     Precautions:  Fall Risk, [place], DROPLET PLUS, O2      Assessment of current deficits    [x]? Functional mobility            [x]?ADLs           [x]? Strength                  []?Cognition    [x]? Functional transfers          [x]? IADLs         [x]? Safety Awareness   [x]? Endurance    []? Fine Coordination                         [x]? Balance      []? Vision/perception   []? Sensation      []? Gross Motor Coordination             []? ROM           []? Delirium                   []? Motor Control      OT PLAN OF CARE   OT POC based on physician orders, patient diagnosis and results of clinical assessment     Frequency/Duration  2-4 days/wk for 2 weeks PRN   Specific OT Treatment Interventions to include:   ADL retraining/adapted techniques and AE recommendations to increase functional independence within precautions                    Energy conservation techniques to improve tolerance for selfcare routine   Functional transfer/mobility training/DME recommendations for increased independence, safety and fall prevention         Patient/family education to increase safety and functional independence             Environmental modifications for safe mobility and completion of ADLs                             Therapeutic activity to improve functional performance during ADLs.                                          Therapeutic exercise to improve tolerance and functional strength for ADLs    Balance retraining/tolerance tasks for facilitation of postural control with dynamic challenges during ADLs .       Positioning to improve functional independence        Recommended Adaptive Equipment: TBD      Home Living: Pt lives alone, 1 story, steps to enter  Son lives nearby and assists as needed   Bathroom setup: walk in shower      Prior Level of Function: assist as needed  with ADLs , assist  with IADLs; ambulated with no walker     Pain Level: Pt did not report pain this date ;   Cognition: A&O: pt alert and oriented grossly               Memory:  Good               Sequencing:  Good               Problem solving:  Fair +               Judgement/safety:  Fair +                Functional Assessment:  AM-PAC Daily Activity Raw Score: 15/24    Initial Eval Status  Date: 12/9/21 Treatment Status  Date: 12/13/21 STGs = LTGs  Time frame: 10-14 days   Feeding Independent        Grooming Set-up    Independent    UB Dressing Set-up  Mod A to don/doff hospital gown  Independent    LB Dressing Min A  Mod A to don brief and doff shoes Mod i   Bathing Mi    Mod I    Toileting SBA   Mod I    Bed Mobility  Supervision   Supine to sit SBA  Sit to supine  Mod I    Functional Transfers Supervision   Sit-stand from bed Min A with wheeled walker  Sit<>stand   Mod I    Functional Mobility CGA,w/walker, O2   Ambulating in room    Mod I  with good tolerance    Balance Sitting:     Static:  Independent     Dynamic:SBA   Standing: CGA/SBA  Static: independent  Dynamic: SBA  Standing: CGA  Independent/supervision    Activity Tolerance Fair with light activity   lowest O2 81%   Educated and encouraged deep breathing tech   Improved 91%  Fair with light activity Good  with ADL activity    Visual/  Perceptual Glasses: reading                          Comments: RN approved patient's participation in activities. Upon arrival, patient sitting EOB with PT present. At end of session, patient lying in bed with call light and phone within reach and all lines and tubes intact.      Treatment: OT treatment provided this date included:    Instruction/training on safety and adapted techniques for completion of ADLs and instruction/training on safe functional mobility/transfer techniques. Pt was Mod A to don brief over BLE with cues for technique and participation to improve safety and participation in ADLs and functional tasks. Pt stood with with Min A and cues for hand placement and safety with wheeled walker. Pt was Mod A to finished managing brief over hips and needed to sit back down. Pt sat with Min A for safe and controlled descent. Pt's O2 was 80% following functional transfer and NRB mask was added. Pt improved to 90% with NRB mask and O2. No functional mobility d/t respiratory status. SOB noted during activity and pt cued on pursed lip breathing with variable carryover. Pt was SBA for sit to supine and O2 was in 90s at end of session with NRB mask. Further skilled OT treatment indicated to increase patient's safety and independence with completion of ADL/IADL tasks in order to maximize patient's functional independence and quality of life. Education: Patient education provided regardin) proper LB dressing technique 2) safe functional transfers 3) pursed lip breathing . Patient demonstrated fair understanding. · Patient has made limited progress towards set goals. · Continue OT plan of care.     Time In: 3361  Time Out: 0958  Total Treatment Time: 16 minutes      Minutes Units   Therapeutic Ex 67855     Therapeutic Activities 66841     ADL/Self Care 60118 16 1   Orthotic Management 23944     Neuro Re-Ed 96159     Non-Billable Time  ---       JAMAR Smith/BOYD  License Number: [id number]

## 2021-12-13 NOTE — CARE COORDINATION
COVID + 12/3. Limited code status. Requiring O2 10 liters non rebreather- [facility] DME following if pt goes home at discharge. Awaiting PT/OT re-eval.  Phone conversation w/ son Genevieve Harper  [phone] to check MICHAEL choices- list emailed to him on Friday- states he will call me later today with his MICHAEL choices. From home alone PTA. Patient will not be eligible for [facility] on discharge if returns home until she establishes with Dr. Kirstin Duenas. Will follow Ortiz Burroughs RN case manager    1300: Received call from pt's Roxanne Wang. MICHAEL choices given 1) [facility]- not accepting COVID pt's prior to 14 days 2) [facility]- not accepting referral 3) [facility]- not accepting COVID pt's prior to 14 days 4) [facility]- referral made-awaiting acceptance.  WILL NEED N-17, transport form, HENS when accepted Ortiz Burroughs RN case manager

## 2021-12-13 NOTE — CONSULTS
D/w hospitalist service, consult cancelled  Patient is [facility] as per POA.   PCCM will sign off, please call PRN

## 2021-12-14 NOTE — CARE COORDINATION
COVID + 12/3. Requiring O2 15 liters high flow NC- [facility] DME following if family decides to have pt return home on discharge. Pt is not eligible for HHC - has not established w/ her PCP yet. [facility] accepted pending O2 requirement and bed availability when ready for discharge- PT am-pac 16- will need updated therapy notes on discharge. N-17 in epic, transport forms on chart-WILL NEED HENS on discharge.  Will follow Rosangela Wright RN case manager

## 2021-12-14 NOTE — PROGRESS NOTES
[facility] Progress Note    Admitting Date and Time: 12/7/2021 10:09 PM  Admit Dx: Pneumonia due to COVID-19 virus [U07.1, J12.82]  COVID-19 [U07.1]      Assessment:    Active Problems:    Pneumonia due to COVID-19 virus    COVID-19  Resolved Problems:    * No resolved hospital problems. *      Plan:  Acute hypoxic respiratory failure  Due to COVID 919 pneumonia  O2 sats in the 80's on presentation  Was on 15 L down to 8, and this morning, worsening hypoxia  O2 14L  Wean down on O2    COVID 19 pneumonia  Started on decadron and baricitinib  procal is not significant, no need for abx  Decadron to 10 mg IV bid  Monitor inflammaory markers. Elevated trop  Chronic, improving    Fall  Mechanical  PTOT    Acute metabolic encephalopathy  Might be due to COVID and steroids  Consider reducing the steroid dose. worsened     Goals of care  Patient is limited code, no intubation.  Family wants to sign her in hospice  Will discuss again with palliative care        Subjective:  Patient is being followed for Pneumonia due to COVID-19 virus [U07.1, J12.82]  COVID-19 [U07.1]   Pt is ok, no complaints, yon 15 L NC    ROS: denies fever, chills, cp, n/v, HA unless stated above     dexamethasone  10 mg IntraVENous Q12H    sodium chloride flush  5-40 mL IntraVENous 2 times per day    Vitamin D  2,000 Units Oral Daily    ascorbic acid  500 mg Oral TID    zinc sulfate  50 mg Oral Daily    albuterol sulfate HFA  2 puff Inhalation 4x daily    enoxaparin  30 mg SubCUTAneous Daily    baricitinib  2 mg Oral Daily    metoprolol succinate  50 mg Oral Daily    atorvastatin  20 mg Oral Daily     sodium chloride flush, 5-40 mL, PRN  sodium chloride, 25 mL, PRN  ondansetron, 4 mg, Q8H PRN   Or  ondansetron, 4 mg, Q6H PRN  polyethylene glycol, 17 g, Daily PRN  acetaminophen, 650 mg, Q6H PRN   Or  acetaminophen, 650 mg, Q6H PRN  guaiFENesin-dextromethorphan, 5 mL, Q4H PRN         Objective:    BP (!) 164/81   Pulse 68 Temp 97.3 °F (36.3 °C) (Axillary)   Resp 20   Ht 5' (1.524 m)   Wt 95 lb 3.2 oz (43.2 kg)   SpO2 92%   BMI 18.59 kg/m²     General Appearance: alert and oriented x1-2 and in no acute distress  Skin: warm and dry  Head: normocephalic and atraumatic  Eyes: pupils equal, round, and reactive to light, extraocular eye movements intact, conjunctivae normal  Neck: neck supple and non tender without mass   Pulmonary/Chest: clear to auscultation bilaterally- no wheezes, rales or rhonchi, normal air movement, no respiratory distress  Cardiovascular: normal rate, normal S1 and S2 and no carotid bruits  Abdomen: soft, non-tender, non-distended, normal bowel sounds, no masses or organomegaly  Extremities: no cyanosis, no clubbing and no edema  Neurologic: no cranial nerve deficit and speech normal        Recent Labs     12/12/21  0620 12/14/21  0510    139   K 4.5 4.7   CL 94* 97*   CO2 28 29   BUN 56* 84*   CREATININE 1.0 1.1*   GLUCOSE 141* 188*   CALCIUM 9.7 9.8       No results for input(s): WBC, RBC, HGB, HCT, MCV, MCH, MCHC, RDW, PLT, MPV in the last 72 hours. NOTE: This report was transcribed using voice recognition software. Every effort was made to ensure accuracy; however, inadvertent computerized transcription errors may be present.   Electronically signed by Kell Beauchamp MD on 12/14/2021 at 2:47 PM

## 2021-12-14 NOTE — DISCHARGE INSTR - COC
Continuity of Care Form    Patient Name: Danyelle Baltazar   :  1928  MRN:  [id number]    Admit date:  2021  Discharge date:  ***    Code Status Order: Limited   Advance Directives:      Admitting Physician:  Junie Pruitt MD  PCP: Jan Terry DO    Discharging Nurse: [facility]/Room#: 3318/5141-J  Discharging Unit Phone Number: ***    Emergency Contact:   Extended Emergency Contact Information  Primary Emergency Contact: Christophe Meyers  Address: [address]  Home Phone: [phone]  Mobile Phone: [phone]  Relation: Child  Preferred language: English   needed? No    Past Surgical History:  Past Surgical History:   Procedure Laterality Date    BRAIN ANEURYSM SURGERY         Immunization History: There is no immunization history on file for this patient.     Active Problems:  Patient Active Problem List   Diagnosis Code    Pneumonia due to COVID-19 virus U07.1, J12.82    COVID-19 U07.1       Isolation/Infection:   Isolation            Droplet Plus          Patient Infection Status       Infection Onset Added Last Indicated Last Indicated By Review Planned Expiration Resolved Resolved By    COVID-19 21 COVID-19, Rapid 12/10/21 12/17/21      Resolved    COVID-19 (Rule Out) 21 COVID-19, Rapid (Ordered)   21 Rule-Out Test Resulted            Nurse Assessment:  Last Vital Signs: BP (!) 147/92   Pulse 83   Temp 97 °F (36.1 °C) (Axillary)   Resp 18   Ht 5' (1.524 m)   Wt 95 lb 3.2 oz (43.2 kg)   SpO2 100%   BMI 18.59 kg/m²     Last documented pain score (0-10 scale): Pain Level: 0  Last Weight:   Wt Readings from Last 1 Encounters:   21 95 lb 3.2 oz (43.2 kg)     Mental Status:  {IP PT MENTAL STATUS:}    IV Access:  { TERRANCE IV ACCESS:047253837}    Nursing Mobility/ADLs:  Walking   {P DME QMNH:753316796}  Transfer  {P DME NSIC:758654186}  Bathing  {CHP DME OPB}  Dressing  {CHP DME CJWL:819509957}  Toileting  {CHP DME WSRP:331726754}  Feeding  {CHP DME VUNK:520351951}  Med Admin  {CHP DME YKUL:809430349}  Med Delivery   { TERRANCE MED Delivery:244079850}    Wound Care Documentation and Therapy:  Wound 21 Coccyx (Active)   Dressing Status Clean; Dry; Intact 21 1001   Dressing/Treatment Other (comment) 21 1001   Dressing Change Due 21 173   Wound Length (cm) 1.4 cm 21 173   Wound Width (cm) 1.4 cm 21   Wound Surface Area (cm^2) 1.96 cm^2 21   Wound Assessment Pink/red 21   Drainage Amount None 21   Misti-wound Assessment Blanchable erythema 21   Number of days: 5        Elimination:  Continence: Bowel: {YES / RK:11136}  Bladder: {YES / IE:59990}  Urinary Catheter: {Urinary Catheter:313061979}   Colostomy/Ileostomy/Ileal Conduit: {YES / PX:84540}       Date of Last BM: ***    Intake/Output Summary (Last 24 hours) at 2021 0724  Last data filed at 2021 1722  Gross per 24 hour   Intake 720 ml   Output --   Net 720 ml     I/O last 3 completed shifts:   In: 5 [P.O.:720]  Out: -     Safety Concerns:     [address] Concerns:601802894}    Impairments/Disabilities:      [address] Impairments/Disabilities:885138826}    Nutrition Therapy:  Current Nutrition Therapy:   [address] Diet List:956725173}    Routes of Feeding: {CHP DME Other Feedings:855144744}  Liquids: {Slp liquid thickness:94868}  Daily Fluid Restriction: {CHP DME Yes amt example:740593785}  Last Modified Barium Swallow with Video (Video Swallowing Test): {Done Not Done KAOH:327720137}    Treatments at the Time of Hospital Discharge:   Respiratory Treatments: ***  Oxygen Therapy:  {Therapy; copd oxygen:32739}  Ventilator:    {RODOLFO SABILLON Vent JIFP:409700733}    Rehab Therapies: {THERAPEUTIC INTERVENTION:9904444017}  Weight Bearing Status/Restrictions: {RODOLFO SABILLON Weight Bearin}  Other Medical Equipment (for information only, NOT a DME order):  {EQUIPMENT:724658280}  Other Treatments: ***    Patient's personal belongings (please select all that are sent with patient):  {CHP DME Belongings:827341512}    RN SIGNATURE:  {Esignature:915214274}    CASE MANAGEMENT/SOCIAL WORK SECTION    Inpatient Status Date: 12/8/2021    Readmission Risk Assessment Score:  Readmission Risk              Risk of Unplanned Readmission:  13           Discharging to Facility/ Agency   Name: [facility].  Address: [address] [place] [address]  Phone: [phone]  IZX:[phone]    Dialysis Facility (if applicable)   Name:  Address:  Dialysis Schedule:  Phone:  Fax:    / signature: Electronically signed by Abdoul Cortes RN on 12/14/2021 at 1:35 PM      PHYSICIAN SECTION    Prognosis: Fair    Condition at Discharge: Stable    Rehab Potential (if transferring to Rehab): Fair    Recommended Labs or Other Treatments After Discharge: ***    Physician Certification: I certify the above information and transfer of Mary Pardo  is necessary for the continuing treatment of the diagnosis listed and that she requires [facility] for less 30 days.      Update Admission H&P: {CHP DME Changes in GMIEZ:377592108}    PHYSICIAN SIGNATURE:  {Esignature:149752212}

## 2021-12-15 NOTE — PROGRESS NOTES
[facility] Progress Note    Admitting Date and Time: 12/7/2021 10:09 PM  Admit Dx: Pneumonia due to COVID-19 virus [U07.1, J12.82]  COVID-19 [U07.1]      Assessment:    Active Problems:    Pneumonia due to COVID-19 virus    COVID-19  Resolved Problems:    * No resolved hospital problems. *      Plan:  Acute hypoxic respiratory failure  Due to COVID 919 pneumonia  O2 sats in the 80's on presentation  Was on 15 L, now on 15 L and NRB  Wean down on O2    COVID 19 pneumonia  Started on decadron and baricitinib  procal is not significant, no need for abx  Decadron to 10 mg IV bid  Monitor inflammaory markers. DDimer is trending up, obtain CTA to rule out PE    Elevated trop  Chronic, improving    Fall  Mechanical  PTOT    Acute metabolic encephalopathy  Might be due to COVID and steroids  Consider reducing the steroid dose.   worsened     Goals of care  After palliative care discussed with the son, patient is DNR-CC        Subjective:  Patient is being followed for Pneumonia due to COVID-19 virus [U07.1, J12.82]  COVID-19 [U07.1]   Pt is ok, no complaints, on 15 L  +NRB    ROS: denies fever, chills, cp, n/v, HA unless stated above     dexamethasone  10 mg IntraVENous Q12H    sodium chloride flush  5-40 mL IntraVENous 2 times per day    Vitamin D  2,000 Units Oral Daily    ascorbic acid  500 mg Oral TID    zinc sulfate  50 mg Oral Daily    albuterol sulfate HFA  2 puff Inhalation 4x daily    enoxaparin  30 mg SubCUTAneous Daily    baricitinib  2 mg Oral Daily    metoprolol succinate  50 mg Oral Daily    atorvastatin  20 mg Oral Daily     sodium chloride flush, 5-40 mL, PRN  sodium chloride, 25 mL, PRN  ondansetron, 4 mg, Q8H PRN   Or  ondansetron, 4 mg, Q6H PRN  polyethylene glycol, 17 g, Daily PRN  acetaminophen, 650 mg, Q6H PRN   Or  acetaminophen, 650 mg, Q6H PRN  guaiFENesin-dextromethorphan, 5 mL, Q4H PRN         Objective:    BP (!) 162/73   Pulse 93   Temp 97.8 °F (36.6 °C) (Axillary)   Resp 20   Ht 5' (1.524 m)   Wt 95 lb 3.2 oz (43.2 kg)   SpO2 99%   BMI 18.59 kg/m²     General Appearance: alert and oriented x1-2 and in no acute distress  Skin: warm and dry  Head: normocephalic and atraumatic  Eyes: pupils equal, round, and reactive to light, extraocular eye movements intact, conjunctivae normal  Neck: neck supple and non tender without mass   Pulmonary/Chest: clear to auscultation bilaterally- no wheezes, rales or rhonchi, normal air movement, no respiratory distress  Cardiovascular: normal rate, normal S1 and S2 and no carotid bruits  Abdomen: soft, non-tender, non-distended, normal bowel sounds, no masses or organomegaly  Extremities: no cyanosis, no clubbing and no edema  Neurologic: no cranial nerve deficit and speech normal        Recent Labs     12/14/21  0510 12/15/21  0515    141   K 4.7 4.4   CL 97* 99   CO2 29 25   BUN 84* 94*   CREATININE 1.1* 1.2*   GLUCOSE 188* 179*   CALCIUM 9.8 9.8       No results for input(s): WBC, RBC, HGB, HCT, MCV, MCH, MCHC, RDW, PLT, MPV in the last 72 hours. NOTE: This report was transcribed using voice recognition software. Every effort was made to ensure accuracy; however, inadvertent computerized transcription errors may be present.   Electronically signed by Guillermo Adame MD on 12/15/2021 at 7:09 AM

## 2021-12-15 NOTE — PROGRESS NOTES
Palliative Care Department  [phone]  Palliative Care Progress Note  Provider Romeo Berumen MD      PATIENT: Danyelle Baltazar  : 1928  MRN: [id number]  ADMISSION DATE: 2021 10:09 PM  Referring Provider: Deisy Jose MD    Palliative Medicine was consulted on hospital day 7 for assistance with Goals of care, Family supportt     HPI:     Carlota Guallpa is a 80 y.o. y/o female with a history of brain aneurysm, hard of hearing who presented to [facility]) on 2021 with  Shortness of Breath. ASSESSMENT/PLAN:     Pertinent Hospital Diagnoses    COVID 19 pneumonia    Palliative Care Encounter / Counseling Regarding Goals of Care  Please see detailed goals of care discussion as below   At this time, Nani Zuñiga, Does Not have capacity for medical decision-making due to having difficulty in hearing.  Outcome of goals of care meeting: Spoke to patient's son, we had a discussion regarding patient's condition. He wants for her to pass away peacefully and naturally, comfort medications ordered   Code status DNR-CC   Advanced Directives: POA or living will in [facility]   Surrogate/Legal NOK: Avtar Zuñiga,[phone], Son/POA    Spiritual assessment: no spiritual distress identified  Bereavement and grief: to be determined  Referrals to: none today    Thank you for the opportunity to participate in the care of Danyelle Baltazar. Romeo Berumen MD  Palliative Medicine     SUBJECTIVE:     Details of Conversation: Spoke with patient's son regarding patient's care, we had a discussion about how she has been continuing. After much discussion, he has decided to allow her to pass away peacefully and naturally. I have changed her CODE STATUS to DNR CC, added comfort medications. Please do not continue any oxygen for patient after removal from her nose. I would just continue providing her with comfort medications.    Prognosis: Guarded    OBJECTIVE:     BP (!) 141/72   Pulse 94   Temp 97.9 °F (36.6 °C) (Axillary)   Resp 20   Ht 5' (1.524 m)   Wt 95 lb 3.2 oz (43.2 kg)   SpO2 96%   BMI 18.59 kg/m²     Due to the current efforts to prevent transmission of COVID-19 and also the need to preserve PPE for other caregivers, a face-to-face encounter with the patient was not performed. That being said, all relevant records and diagnostic tests were reviewed, including laboratory results and imaging. Please reference any relevant documentation elsewhere. Care will be coordinated with the primary service and other specialties as appropriate. Objective data reviewed: labs, images, records, medication use, vitals and chart    Time/Communication  Greater than 50% of time spent, total 20 minutes in counseling and coordination of care at the bedside regarding goals of care. Thank you for allowing Palliative Medicine to participate in the care of Nani Zuñiga. Note: This report was completed using computerize voiced recognition software. Every effort has been made to ensure accuracy; however, inadvertent computerized transcription errors may be present.

## 2021-12-15 NOTE — PROGRESS NOTES
Spoke with patient's son. Updated him on patient condition and plan of care.     Electronically signed by Bianca Shi RN on 12/15/2021 at 1:16 PM

## 2021-12-15 NOTE — PROGRESS NOTES
Occupational Therapy  OT BEDSIDE TREATMENT NOTE      Date:12/15/2021  Patient Name: Codey Ellis  MRN: [id number]  : 1928  Room: [address] [place] [address]     Evaluating OT: Jorje Lee OTR/L   [id number]       Referring Provider:Jeanine Sylvester PA-C    Specific Provider Orders/Date:OT eval and treat 2021       Diagnosis:  Pneumonia due to COVID-19 virus [U07.1, J12.82]  COVID-19 [U07.1]     Pertinent Medical History: brain aneurysm     Precautions:  Fall Risk, [place], DROPLET PLUS, O2      Assessment of current deficits    [x]? ? Functional mobility            [x]? ?ADLs           [x]? ? Strength                  []? ? Cognition    [x]? ? Functional transfers          [x]? ? IADLs         [x]? ? Safety Awareness   [x]? ?Endurance    []? ? Fine Coordination                         [x]? ? Balance      []? ? Vision/perception   []? ? Sensation      []? ?Gross Motor Coordination             []? ? ROM           []? ? Delirium                   []? ? Motor Control      OT PLAN OF CARE   OT POC based on physician orders, patient diagnosis and results of clinical assessment     Frequency/Duration  2-4 days/wk for 2 weeks PRN   Specific OT Treatment Interventions to include:   ADL retraining/adapted techniques and AE recommendations to increase functional independence within precautions                    Energy conservation techniques to improve tolerance for selfcare routine   Functional transfer/mobility training/DME recommendations for increased independence, safety and fall prevention         Patient/family education to increase safety and functional independence             Environmental modifications for safe mobility and completion of ADLs                             Therapeutic activity to improve functional performance during ADLs.                                          Therapeutic exercise to improve tolerance and functional strength for ADLs    Balance retraining/tolerance tasks for facilitation of postural control with dynamic challenges during ADLs .       Positioning to improve functional independence        Recommended Adaptive Equipment: TBD      Home Living: Pt lives alone, 1 story, steps to enter  Son lives nearby and assists as needed   Bathroom setup: walk in shower      Prior Level of Function: assist as needed  with ADLs , assist  with IADLs; ambulated with no walker     Pain Level: Pt did not report pain. Cognition: Awake. Minimal verbalization to only state \"I can't breath\". Does not follow directions for simple ADL activity.               Functional Assessment:  AM-PAC Daily Activity Raw Score: 8/24    Initial Eval Status  Date: 12/9/21 Treatment Status  Date:12/15/21 STGs = LTGs  Time frame: 10-14 days   Feeding Independent        Grooming Set-up  Dependent. Washcloth placed in pt's hand. Pt with no active attempt to assist with washing face or hands.    Independent    UB Dressing Set-up  Dependent  Independent    LB Dressing Min A   Mod i   Bathing Mi    Mod I    Toileting SBA   Mod I    Bed Mobility  Supervision   Supine to sit Dependent for repositioning. Mod I    Functional Transfers Supervision   Sit-stand from bed      Mod I    Functional Mobility CGA,w/walker, O2   Ambulating in room   Mod I  with good tolerance    Balance Sitting:     Static:  Independent     Dynamic:SBA   Standing: CGA/SBA    Independent/supervision    Activity Tolerance Fair with light activity   lowest O2 81%   Educated and encouraged deep breathing tech   Improved 91%  poor  Good  with ADL activity        Comments:  Pt laying in the bed. Poor participation in any activity. States \"I can't breathe\". O2 saturation 99%. No active movement to assist in any activity. Nursing notified of pt complaint. Education/treatment:  ADL retraining with facilitation of movement to increase simple self care skills. Pt education of daily orientation. · Pt has made poor  progress towards set goals.    ·     Time In: 11:25  Time Out: 11:37     Min Units   Therapeutic Ex [id number]     Therapeutic Activities [id number]    ADL/Self Care [id number]   Orthotic Management [id number]     Neuro Re-Ed 11432     Non-Billable Time     TOTAL TIMED TREATMENT 12 Claudy YOON/L [id number]

## 2021-12-15 NOTE — CARE COORDINATION
COVID + 12/3. Requiring O2 15 liters high flow NC along with 15 liters O2 non rebreather- [facility] DME following . Pt is not eligible for HHC - has not established w/ her PCP yet. [facility]f following-accepted pending O2 requirement and bed availability when ready for discharge- will need updated therapy notes on discharge. N-17 in epic, transport forms on chart-WILL NEED HENS.  Will follow  Mabel Brothers RN case manager

## 2021-12-15 NOTE — PROGRESS NOTES
Nutrition Assessment     Type and Reason for Visit: Initial, Consult, RD Nutrition Re-Screen/LOS    Nutrition Assessment:  Pt to be assessed per LOS protocol; consult for poor intake. Chart reviewed. Unfortunately, pt w/ noted change to comfort care only at this time. ONS started. Will sign-off. Please consult if RD noted.     Electronically signed by Taylor Givens RD, PRINCE on 12/15/21 at 2:39 PM EST    Contact: ext [phone]

## 2021-12-16 NOTE — CARE COORDINATION
DNR-CC. On room air. [id number] updated on pt status. Hospice consult noted.  Phone conversation w/ eliecer Mcconnell [phone]- Hospice choice discussed- Shari Mcconnell is unable to give Hospice choice at this time- requesting he call me back- awaiting call back Ann Marie Blank RN case manager    [phone]: Per nursing, conversation again w/ eliecer Mcconnell- chose [facility] - referral made Ann Marie Blank RN case manager

## 2021-12-16 NOTE — CARE COORDINATION
To Hospice House via [facility] between 4:30-5pm today.  Family notified per Dave Rudolph RN case manager

## 2021-12-16 NOTE — PROGRESS NOTES
Follow-up visit made to patient's bedside. FIDELINA present and requested to speak with this nurse. Nani is a 80year old female who presented to the ED 12/3/21 with a cough and fatigue. She tested positive for covid and was sent home with medications. She came back on 12/8/21 with increased symptoms and SOB. She was placed on oxygen and required increasing levels. She and her POA spoke with palliative care and patient wanted to remain comfortable and pass naturally. She did not want to escalate care. Nani's oxygen was removed yesterday and comfort medications were provided. She has had 9 doses of 2mg of morphine and 8 does of ativan 0.5mg IV since yesterday. She is having periods of apnea followed by deep respirations. She opens her eyes at times, starring off, not tracking. She is cold to the touch. Feet and knees have some mottling. Pulse ox has remained in the mid 70's on room air. BP:135/75; HR 89. RR 12 with 15-30 seconds of apnea. Spoke with Dr. Hema Tamayo who accepted patient inpatient level of care hospice. Received bed 113 with a transport time of 1700. [facility] able to transport at 1700. Updated bedside nurse and requested discharge order be obtained. Send patient with IV. Updated CM and son, Eulalio Evans. He will sign consents at hospice house. Gave nurse to nurse report to hospice house.     Electronically signed by Kathrine Jeffries RN on 12/16/2021 at 3:36 PM

## 2021-12-16 NOTE — PROGRESS NOTES
HOSPICE [facility]     Liaison Information Visit Note            Patient Name: Guero Martin   Admit date:  12/7/2021   Hospital Admitting Physician:  Robbi Dan MD   PCP:  Kinga Fernandez DO  Primary Insurance: Payor: Louis Covert /  /  /    Emergency Contact: Jody Lama  Advance Directive:  Yes  {DPOA-HC Name-Relation: Jody Lama - son   Phone: [phone]    Terminal Diagnosis Acute hypoxic respiratory failure due to PNA as confirmed by Dr. Janice Luciano Problem List:   Patient Active Problem List   Diagnosis Code    Pneumonia due to COVID-19 virus U07.1, J12.82    COVID-19 U07.1     Code Status Order: DNR-CC   Allergies:  Patient has no known allergies. Family Goal: Comfort Care  Meeting held with Shaun Vann  The hospice benefit and philosophy were explained including that hospice is end of life care in which, per Medicare, a patient has a terminal diagnosis that life expectancy would be 6 months or less. Hospice care is a service that is covered by most insurance plans. The following levels of hospice care were discussed including, routine level of hospice care at private home or facility, which patient/family is responsible for any room and board fees at the facility, and general in patient level of care (GIP) at the [facility] for short term symptom management. Per Medicare guidelines, a patient is considered appropriate for GIP if they have uncontrolled symptoms such as pain, agitation, labored breathing or nausea/vomiting. Once symptoms become managed, the patient would need to be moved to a lower level of care such as home with hospice, or ECF with hospice.   Family informed that with the routine level of care at home or ECF,  the hospice team consists of the RN who visits 1-3 times a week, a  who visits within the first five days of the hospice election, the personal care team who visit 1-3 times a week, non-medical volunteers and Chaplains. Explained that at home in routine level of care, familles are responsible for the 24 hour care. Discussed that under hospice care patient would not receive chemotherapy, radiation, immune therapy, IV antibiotics, dialysis or blood transfusions. Explained that once in hospice care, all aggressive treatments would be stopped and allow nature to takes its course with focus on comfort care for the patient. Made a visit to floor. Saw patient though window. She has not been medicated since last night. No family present. Placed a call to son Chuckie Kussmaul. Chuckie Kussmaul understanding of hospice services and wants his mom to be comfortable. Discussed her condition and life expectancy is probably hours to days, but could be weeks. Brain stated they could not afford ECF with hospice and is unsure he would be able to care for his mom at home. He asked if his mom could stay in the hospital until she passed. Explained she is not receiving treatment and is comfort care, so insurance would not continue to pay for her to stay as it is unknown how long she will live. Chuckie Kussmaul is overwhelmed. He would like to meet in person later this afternoon with his wife present to discuss hospice further. Updated bedside nurse, charge nurse, and CM. Discharge Plan:  Discharge Disposition; pending  [facility] plan:  1. Follow-up with family this afternoon. 2. Please call [facility] [phone] with any questions. 3. Patient not currently under the care of hospice.     Electronically signed by Luis Hastings RN on 12/16/2021 at 12:50 PM

## 2021-12-17 NOTE — DISCHARGE SUMMARY
[facility] Physician Discharge Summary       No follow-up provider specified. Dispo: [facility]    Patient ID:  Jaiden Cain  [id number]  45 y.o.  7/13/1928    Admit date: 12/7/2021    Discharge date and time:  12/17/2021  7:11 AM    Admission Diagnoses: Active Problems:    Pneumonia due to COVID-19 virus    COVID-19  Resolved Problems:    * No resolved hospital problems. *      Discharge Diagnoses: Active Problems:    Pneumonia due to COVID-19 virus    COVID-19  Resolved Problems:    * No resolved hospital problems. *      Consults:  IP CONSULT TO PHARMACY  IP CONSULT TO HOME CARE NEEDS  IP CONSULT TO PALLIATIVE CARE  IP CONSULT TO IV TEAM  IP CONSULT TO IV TEAM  IP CONSULT TO PULMONOLOGY  IP CONSULT TO DIETITIAN  IP CONSULT TO [facility] / Critical access hospital Course:   Patient Jaiden Cain is a 80 y.o. presented with Pneumonia due to COVID-19 virus [U07.1, J12.82]  COVID-19 [U07.1]    Patient is 81 yo Female with a history of brain aneurysm, hard of hearing, and diagnosed with COVID-19 on 12/03/2021, presented to the ED on 12/8 with worsening dry cough, sore throat, and fatigue  She was admitted to the hospital and Pulmonology service was consulted. Her condition has gotten worse and requiring more oxygen support. Palliative care medicine was consulted, patient's son he has decided to allow her to pass away peacefully and naturally. Code status was changed to [facility] and patient was transferred to [facility] on 12/16/2021. Discharge Exam:  General Appearance: open eyes to name but not oriented.  On RA  Skin: warm and dry  Head: normocephalic and atraumatic  Eyes: pupils equal, round, and reactive to light, extraocular eye movements intact, conjunctivae normal  Neck: neck supple and non tender without mass   Pulmonary/Chest: clear to auscultation bilaterally- no wheezes, rales or rhonchi, normal air movement, no respiratory distress  Cardiovascular: normal rate, normal S1 and S2 and no carotid bruits  Abdomen: soft, non-tender, non-distended, normal bowel sounds, no masses or organomegaly  Extremities: no cyanosis, no clubbing and no edema  Neurologic: no cranial nerve deficit and speech normal    I/O last 3 completed shifts: In: 360 [P.O.:360]  Out: -   No intake/output data recorded. LABS:  Recent Labs     12/15/21  0515 12/15/21  1355    143   K 4.4 4.4   CL 99 99   CO2 25 30*   BUN 94* 92*   CREATININE 1.2* 1.3*   GLUCOSE 179* 174*   CALCIUM 9.8 10.1       No results for input(s): WBC, RBC, HGB, HCT, MCV, MCH, MCHC, RDW, PLT, MPV in the last 72 hours. No results for input(s): POCGLU in the last 72 hours. Imaging:  No results found.     Patient Instructions:      Medication List      ASK your doctor about these medications    brompheniramine-pseudoephedrine-DM 2-30-10 MG/5ML syrup  Commonly known as: Bromfed DM  Take 5 mLs by mouth 4 times daily as needed for Congestion or Cough     metoprolol succinate 50 MG extended release tablet  Commonly known as: TOPROL XL     ondansetron 4 MG disintegrating tablet  Commonly known as: ZOFRAN-ODT  Take 1 tablet by mouth 3 times daily as needed for Nausea or Vomiting     simvastatin 40 MG tablet  Commonly known as: ZOCOR              Note that more than 30 minutes was spent in preparing discharge papers, discussing discharge with patient, medication review, etc.    Signed:  Electronically signed by Ye Hartley MD on 12/17/2021 at 7:11 AM

## 2024-12-12 NOTE — ED PROVIDER NOTES
Pt p#[phone], pt missed her doctors appt yesterday , pt states she couldn't make it but needs her disability letter please    This is a 75-year-old female with no significant past medical history who presents to the ED for evaluation of a cough. Patient is hard of hearing and sometimes difficulty get a history from therefore history is also obtained from the son who I spoke with on the phone. Patient for about the past 2-1/2 days been having a cough well complain of some sore throat and difficulty breathing. Patient's breathing has improved  over the past day but she continues to have a cough. No reported fevers chills but does have some reported fatigue as well. No reported ill contacts. Patient son checks on the patient daily as he lives about 10 minutes away stated that today while talking on the phone she sounded winded and brought her to the ER for further evaluation. No other reported mitigating or exacerbating factors. Patient is still eating well. Patient does not use oxygen at home. The history is provided by the patient and a relative. No  was used. Review of Systems   Constitutional: Positive for fatigue. HENT: Positive for congestion and sore throat. Eyes: Negative for visual disturbance. Respiratory: Positive for cough and shortness of breath. Cardiovascular: Negative for chest pain. Gastrointestinal: Negative for abdominal pain. Endocrine: Negative for polyuria. Genitourinary: Negative for dysuria. Musculoskeletal: Negative for back pain. Skin: Negative for rash. Allergic/Immunologic: Negative for immunocompromised state. Neurological: Negative for headaches. Hematological: Does not bruise/bleed easily. Psychiatric/Behavioral: Negative for confusion. Physical Exam  Vitals and nursing note reviewed. Constitutional:       General: She is not in acute distress. Appearance: She is well-developed. HENT:      Head: Normocephalic and atraumatic.       Ears:      Comments: Hard of hearing     Mouth/Throat:      Mouth: Mucous membranes are moist. Eyes:      Extraocular Movements: Extraocular movements intact. Pupils: Pupils are equal, round, and reactive to light. Neck:      Vascular: No JVD. Cardiovascular:      Rate and Rhythm: Normal rate and regular rhythm. Heart sounds: No murmur heard. Pulmonary:      Effort: Pulmonary effort is normal.      Breath sounds: No wheezing, rhonchi or rales. Chest:      Chest wall: No tenderness. Abdominal:      General: There is no distension. Palpations: Abdomen is soft. Tenderness: There is no abdominal tenderness. There is no guarding or rebound. Hernia: No hernia is present. Musculoskeletal:      Cervical back: Normal range of motion and neck supple. Right lower leg: No edema. Left lower leg: No edema. Skin:     General: Skin is warm and dry. Capillary Refill: Capillary refill takes less than 2 seconds. Neurological:      General: No focal deficit present. Mental Status: She is alert and oriented to person, place, and time. Cranial Nerves: No cranial nerve deficit. Psychiatric:         Mood and Affect: Mood normal.         Behavior: Behavior normal.          Procedures     MDM  Number of Diagnoses or Management Options  COVID-19  Diagnosis management comments: Patient is a pleasant 80year-old female who presented to the ED mostly for a sore throat and cough. She did have some reported shortness of breath but no active chest pain. She had normal oxygen saturation both at rest and with ambulating well above 90%. No signs of respiratory distress patient a CTA which showed no signs of pulmonary embolism. Patient's high-sensitivity troponins were downtrending EKG only showed nonspecific changes nothing to compare to. I discussed at length with the patient's son we agreed on outpatient follow-up both myself son and patient she was given supportive medications and strict return precautions.        ED Course as of 12/04/21 0535   Sat Dec 04, 2021 12 Spoke with son at length, we agreed on outpatient follow up [BP]      ED Course User Index  [BP] Daniel Lambert DO        ED Course as of 12/04/21 0535   Sat Dec 04, 2021   0125 Spoke with son at length, we agreed on outpatient follow up [BP]      ED Course User Index  [BP] Daniel Lambert DO       --------------------------------------------- PAST HISTORY ---------------------------------------------  Past Medical History:  has no past medical history on file. Past Surgical History:  has no past surgical history on file. Social History:      Family History: family history is not on file. The patients home medications have been reviewed. Allergies: Patient has no known allergies.     -------------------------------------------------- RESULTS -------------------------------------------------  Labs:  Results for orders placed or performed during the hospital encounter of 12/03/21   COVID-19, Rapid    Specimen: Nasopharyngeal Swab   Result Value Ref Range    SARS-CoV-2, NAAT DETECTED (A) Not Detected   Comprehensive Metabolic Panel   Result Value Ref Range    Sodium 136 132 - 146 mmol/L    Potassium 4.4 3.5 - 5.0 mmol/L    Chloride 98 98 - 107 mmol/L    CO2 25 22 - 29 mmol/L    Anion Gap 13 7 - 16 mmol/L    Glucose 102 (H) 74 - 99 mg/dL    BUN 25 (H) 6 - 23 mg/dL    CREATININE 0.9 0.5 - 1.0 mg/dL    GFR Non-African American 58 >=60 mL/min/1.73    GFR African American >60     Calcium 9.8 8.6 - 10.2 mg/dL    Total Protein 6.7 6.4 - 8.3 g/dL    Albumin 3.6 3.5 - 5.2 g/dL    Total Bilirubin 0.8 0.0 - 1.2 mg/dL    Alkaline Phosphatase 76 35 - 104 U/L    ALT 9 0 - 32 U/L    AST 22 0 - 31 U/L   CBC Auto Differential   Result Value Ref Range    WBC 11.8 (H) 4.5 - 11.5 E9/L    RBC 4.89 3.50 - 5.50 E12/L    Hemoglobin 13.4 11.5 - 15.5 g/dL    Hematocrit 42.9 34.0 - 48.0 %    MCV 87.7 80.0 - 99.9 fL    MCH 27.4 26.0 - 35.0 pg    MCHC 31.2 (L) 32.0 - 34.5 %    RDW 13.4 11.5 - 15.0 fL    Platelets 307 174 - 450 E9/L    MPV 9.7 7.0 - 12.0 fL    Neutrophils % 77.4 43.0 - 80.0 %    Immature Granulocytes % 0.4 0.0 - 5.0 %    Lymphocytes % 12.1 (L) 20.0 - 42.0 %    Monocytes % 9.7 2.0 - 12.0 %    Eosinophils % 0.1 0.0 - 6.0 %    Basophils % 0.3 0.0 - 2.0 %    Neutrophils Absolute 9.12 (H) 1.80 - 7.30 E9/L    Immature Granulocytes # 0.05 E9/L    Lymphocytes Absolute 1.43 (L) 1.50 - 4.00 E9/L    Monocytes Absolute 1.14 (H) 0.10 - 0.95 E9/L    Eosinophils Absolute 0.01 (L) 0.05 - 0.50 E9/L    Basophils Absolute 0.03 0.00 - 0.20 E9/L   Troponin   Result Value Ref Range    Troponin, High Sensitivity 54 (H) 0 - 9 ng/L   Brain Natriuretic Peptide   Result Value Ref Range    Pro-BNP 9,137 (H) 0 - 450 pg/mL   Lactic Acid, Plasma   Result Value Ref Range    Lactic Acid 1.7 0.5 - 2.2 mmol/L   Magnesium   Result Value Ref Range    Magnesium 1.8 1.6 - 2.6 mg/dL   Troponin   Result Value Ref Range    Troponin, High Sensitivity 53 (H) 0 - 9 ng/L   D-Dimer, Quantitative   Result Value Ref Range    D-Dimer, Quant 947 ng/mL DDU   Troponin   Result Value Ref Range    Troponin, High Sensitivity 49 (H) 0 - 9 ng/L   EKG 12 Lead   Result Value Ref Range    Ventricular Rate 77 BPM    Atrial Rate 77 BPM    P-R Interval 160 ms    QRS Duration 60 ms    Q-T Interval 390 ms    QTc Calculation (Bazett) 441 ms    P Axis 72 degrees    R Axis 76 degrees    T Axis 72 degrees     EKG: This EKG is signed and interpreted by me. Rate: 77  Rhythm: Sinus  Interpretation: non-specific EKG, no st elevation, QTc at 441, no ectopy  Comparison: no previous EKG    Radiology:  CTA PULMONARY W CONTRAST   Final Result   No evidence of pulmonary embolism or acute pulmonary abnormality. Coronary and aortic atherosclerotic calcification.          XR CHEST (2 VW)   Final Result   No active cardiopulmonary disease.             ------------------------- NURSING NOTES AND VITALS REVIEWED ---------------------------  Date / Time Roomed:  12/3/2021  8:54 PM  ED Bed Assignment:  TANNER/TANNER    The nursing notes within the ED encounter and vital signs as below have been reviewed. /69   Pulse 80   Temp 98.4 °F (36.9 °C)   Resp 16   SpO2 97%   Oxygen Saturation Interpretation: Normal      ------------------------------------------ PROGRESS NOTES ------------------------------------------  4:46 AM EST  I have spoken with the patient and discussed todays results, in addition to providing specific details for the plan of care and counseling regarding the diagnosis and prognosis. Their questions are answered at this time and they are agreeable with the plan. I discussed at length with them reasons for immediate return here for re evaluation. They will followup with their and the on call primary care physician, general surgeon and orthopedic physician by calling their office tomorrow and on Monday.      --------------------------------- ADDITIONAL PROVIDER NOTES ---------------------------------  At this time the patient is without objective evidence of an acute process requiring hospitalization or inpatient management. They have remained hemodynamically stable throughout their entire ED visit and are stable for discharge with outpatient follow-up. The plan has been discussed in detail and they are aware of the specific conditions for emergent return, as well as the importance of follow-up. Discharge Medication List as of 12/4/2021  1:36 AM      START taking these medications    Details   brompheniramine-pseudoephedrine-DM (BROMFED DM) 2-30-10 MG/5ML syrup Take 5 mLs by mouth 4 times daily as needed for Congestion or Cough, Disp-240 mL, R-0Print      ondansetron (ZOFRAN-ODT) 4 MG disintegrating tablet Take 1 tablet by mouth 3 times daily as needed for Nausea or Vomiting, Disp-21 tablet, R-0Print             Diagnosis:  1. COVID-19        Disposition:  Patient's disposition: Discharge to home  Patient's condition is stable.       Silvestre Elliott,   12/04/21 2778